# Patient Record
Sex: FEMALE | Race: BLACK OR AFRICAN AMERICAN | NOT HISPANIC OR LATINO | Employment: FULL TIME | URBAN - METROPOLITAN AREA
[De-identification: names, ages, dates, MRNs, and addresses within clinical notes are randomized per-mention and may not be internally consistent; named-entity substitution may affect disease eponyms.]

---

## 2017-11-09 PROBLEM — N85.02 COMPLEX ATYPICAL ENDOMETRIAL HYPERPLASIA: Status: ACTIVE | Noted: 2017-11-09

## 2017-11-09 RX ORDER — CARVEDILOL 25 MG/1
25 TABLET ORAL 2 TIMES DAILY WITH MEALS
COMMUNITY

## 2017-11-09 RX ORDER — AMLODIPINE BESYLATE 10 MG/1
10 TABLET ORAL DAILY
COMMUNITY

## 2017-11-09 RX ORDER — VALSARTAN AND HYDROCHLOROTHIAZIDE 320; 25 MG/1; MG/1
1 TABLET, FILM COATED ORAL DAILY
COMMUNITY

## 2017-11-09 NOTE — PROGRESS NOTES
"Subjective:       Patient ID: Brittany Oates is a 62 y.o. female.    Chief Complaint: Atypical endometrial hyperplasia     HPI     Recently seen by Dr. Ranjana Koo for PMB.  Patient had presented to ED in [facility] with PMB with passage of clots.   She was then sent to Dr. Koo. US showed 4 cm EMS. Uterus measures 9 x 6 x 6 cm. No adnexal masses. EMBx shgowed "complex hyperplasia with moderate atypia".   Dr. Koo did a saline infusion US on 11/8/2017 and the thickened EMS is diffuse and not suggestive of a polyp.    Pap is CAMILA.     Referred for further management.     Past Medical History:   Diagnosis Date    Anemia     Complex atypical endometrial hyperplasia 11/9/2017    Essential hypertension     Sickle cell anemia      History reviewed. No pertinent surgical history.  Family History   Problem Relation Age of Onset    Colon cancer Mother     Breast cancer Mother     Breast cancer Sister      in her 50s    Ovarian cancer Neg Hx     Uterine cancer Neg Hx      Social History   Substance Use Topics    Smoking status: Never Smoker    Smokeless tobacco: Never Used    Alcohol use No     Review of patient's allergies indicates:  No Known Allergies    Current Outpatient Prescriptions:     amLODIPine (NORVASC) 10 MG tablet, Take 10 mg by mouth once daily., Disp: , Rfl:     carvedilol (COREG) 25 MG tablet, Take 25 mg by mouth 2 (two) times daily with meals., Disp: , Rfl:     valsartan-hydrochlorothiazide (DIOVAN-HCT) 320-25 mg per tablet, Take 1 tablet by mouth once daily., Disp: , Rfl:     medroxyPROGESTERone (PROVERA) 10 MG tablet, Take 1 tablet (10 mg total) by mouth once daily., Disp: 30 tablet, Rfl: 0      Review of Systems   Constitutional: Negative for chills, fatigue and fever.   Respiratory: Negative for cough, shortness of breath and wheezing.    Cardiovascular: Negative for chest pain, palpitations and leg swelling.   Gastrointestinal: Negative for abdominal pain, constipation, diarrhea, " "nausea and vomiting.   Genitourinary: Negative for difficulty urinating, dysuria, frequency, genital sores, hematuria, urgency, vaginal bleeding, vaginal discharge and vaginal pain.   Neurological: Negative for weakness.   Hematological: Negative for adenopathy. Does not bruise/bleed easily.   Psychiatric/Behavioral: The patient is not nervous/anxious.        Objective:     BP (!) 150/89   Pulse 83   Ht 4' 11" (1.499 m)   Wt 72.7 kg (160 lb 4.4 oz)   BMI 32.37 kg/m²     Physical Exam   Constitutional: She is oriented to person, place, and time. She appears well-developed and well-nourished.   HENT:   Head: Normocephalic and atraumatic.   Eyes: No scleral icterus.   Neck: Neck supple. No tracheal deviation present. No thyroid mass and no thyromegaly present.   Cardiovascular: Normal rate and regular rhythm.    Pulmonary/Chest: Effort normal and breath sounds normal. She has no wheezes.   Abdominal: Soft. She exhibits no distension and no mass. There is no hepatosplenomegaly. There is no tenderness. There is no rebound and no guarding.   Genitourinary:   Genitourinary Comments: Bimanual exam:  Vulva: no lesions. Normal appearance  Urethra: Normal size and location. No lesions  Bladder: No masses or tenderness.  Vagina: normal mucosa. No lesion  Cervix: normal   Uterus: normal. Difficult to palpate due to obesity.  Adnexa: no masses.  Rectovaginal: No posterior cul de sac thickening or nodularity.  Rectal: no masses. Nontender. Normal tone.     EMBx done. Small amount of bleeding afterwards.      Musculoskeletal: She exhibits no edema or tenderness.   Lymphadenopathy:     She has no cervical adenopathy.     She has no axillary adenopathy.        Right: No inguinal and no supraclavicular adenopathy present.        Left: No inguinal and no supraclavicular adenopathy present.   Neurological: She is alert and oriented to person, place, and time.   Skin: Skin is warm and dry.   Psychiatric: She has a normal mood and " affect. Her behavior is normal. Judgment and thought content normal.       Assessment:       1. Complex atypical endometrial hyperplasia        Plan:   Complex atypical endometrial hyperplasia  I discussed proceeding with RALH/BSO and staging as indicated by frozen section.   Consent forms were reviewed with patient. Questions were answered. Patient voiced understanding. Consents were signed.  Preop orders placed.   -     CBC auto differential; Future; Expected date: 11/10/2017  -     Basic metabolic panel; Future; Expected date: 11/10/2017  -     EKG 12-lead; Future    -     medroxyPROGESTERone (PROVERA) 10 MG tablet; Take 1 tablet (10 mg total) by mouth once daily.  Dispense: 30 tablet; Refill: 0      Distress Screening Results: Psychosocial Distress screening score of Distress Score: 5 noted and reviewed. No intervention indicated.

## 2017-11-10 ENCOUNTER — TELEPHONE (OUTPATIENT)
Dept: GYNECOLOGIC ONCOLOGY | Facility: CLINIC | Age: 62
End: 2017-11-10

## 2017-11-10 ENCOUNTER — LAB VISIT (OUTPATIENT)
Dept: LAB | Facility: HOSPITAL | Age: 62
End: 2017-11-10
Attending: OBSTETRICS & GYNECOLOGY
Payer: COMMERCIAL

## 2017-11-10 ENCOUNTER — INITIAL CONSULT (OUTPATIENT)
Dept: GYNECOLOGIC ONCOLOGY | Facility: CLINIC | Age: 62
End: 2017-11-10
Payer: COMMERCIAL

## 2017-11-10 ENCOUNTER — HOSPITAL ENCOUNTER (OUTPATIENT)
Dept: CARDIOLOGY | Facility: CLINIC | Age: 62
Discharge: HOME | End: 2017-11-10
Payer: COMMERCIAL

## 2017-11-10 VITALS
HEIGHT: 59 IN | WEIGHT: 160.25 LBS | SYSTOLIC BLOOD PRESSURE: 150 MMHG | DIASTOLIC BLOOD PRESSURE: 89 MMHG | BODY MASS INDEX: 32.31 KG/M2 | HEART RATE: 83 BPM

## 2017-11-10 DIAGNOSIS — N85.02 COMPLEX ATYPICAL ENDOMETRIAL HYPERPLASIA: ICD-10-CM

## 2017-11-10 DIAGNOSIS — N85.02 COMPLEX ATYPICAL ENDOMETRIAL HYPERPLASIA: Primary | ICD-10-CM

## 2017-11-10 LAB
ANION GAP SERPL CALC-SCNC: 8 MMOL/L
BASOPHILS # BLD AUTO: 0.03 K/UL
BASOPHILS NFR BLD: 0.6 %
BUN SERPL-MCNC: 14 MG/DL
CALCIUM SERPL-MCNC: 9.3 MG/DL
CHLORIDE SERPL-SCNC: 109 MMOL/L
CO2 SERPL-SCNC: 25 MMOL/L
CREAT SERPL-MCNC: 0.7 MG/DL
DIFFERENTIAL METHOD: ABNORMAL
EOSINOPHIL # BLD AUTO: 0.1 K/UL
EOSINOPHIL NFR BLD: 2.3 %
ERYTHROCYTE [DISTWIDTH] IN BLOOD BY AUTOMATED COUNT: 13.3 %
EST. GFR  (AFRICAN AMERICAN): >60 ML/MIN/1.73 M^2
EST. GFR  (NON AFRICAN AMERICAN): >60 ML/MIN/1.73 M^2
GLUCOSE SERPL-MCNC: 81 MG/DL
HCT VFR BLD AUTO: 31.9 %
HGB BLD-MCNC: 10.3 G/DL
IMM GRANULOCYTES # BLD AUTO: 0.02 K/UL
IMM GRANULOCYTES NFR BLD AUTO: 0.4 %
LYMPHOCYTES # BLD AUTO: 2.5 K/UL
LYMPHOCYTES NFR BLD: 48.8 %
MCH RBC QN AUTO: 29.4 PG
MCHC RBC AUTO-ENTMCNC: 32.3 G/DL
MCV RBC AUTO: 91 FL
MONOCYTES # BLD AUTO: 0.5 K/UL
MONOCYTES NFR BLD: 9.9 %
NEUTROPHILS # BLD AUTO: 2 K/UL
NEUTROPHILS NFR BLD: 38 %
NRBC BLD-RTO: 0 /100 WBC
PLATELET # BLD AUTO: 262 K/UL
PMV BLD AUTO: 9.1 FL
POTASSIUM SERPL-SCNC: 3.6 MMOL/L
RBC # BLD AUTO: 3.5 M/UL
SODIUM SERPL-SCNC: 142 MMOL/L
WBC # BLD AUTO: 5.14 K/UL

## 2017-11-10 PROCEDURE — 85025 COMPLETE CBC W/AUTO DIFF WBC: CPT

## 2017-11-10 PROCEDURE — 80048 BASIC METABOLIC PNL TOTAL CA: CPT

## 2017-11-10 PROCEDURE — 99205 OFFICE O/P NEW HI 60 MIN: CPT | Mod: 25,S$GLB,, | Performed by: OBSTETRICS & GYNECOLOGY

## 2017-11-10 PROCEDURE — 58100 BIOPSY OF UTERUS LINING: CPT | Mod: S$GLB,,, | Performed by: OBSTETRICS & GYNECOLOGY

## 2017-11-10 PROCEDURE — 99999 PR PBB SHADOW E&M-EST. PATIENT-LVL III: CPT | Mod: PBBFAC,,, | Performed by: OBSTETRICS & GYNECOLOGY

## 2017-11-10 PROCEDURE — 88305 TISSUE EXAM BY PATHOLOGIST: CPT | Performed by: PATHOLOGY

## 2017-11-10 PROCEDURE — 36415 COLL VENOUS BLD VENIPUNCTURE: CPT

## 2017-11-10 PROCEDURE — 93000 ELECTROCARDIOGRAM COMPLETE: CPT | Mod: S$GLB,,, | Performed by: INTERNAL MEDICINE

## 2017-11-10 RX ORDER — MEDROXYPROGESTERONE ACETATE 10 MG/1
10 TABLET ORAL DAILY
Qty: 30 TABLET | Refills: 0 | Status: SHIPPED | OUTPATIENT
Start: 2017-11-10 | End: 2017-11-10 | Stop reason: SDUPTHER

## 2017-11-10 RX ORDER — MEDROXYPROGESTERONE ACETATE 10 MG/1
10 TABLET ORAL DAILY
Qty: 30 TABLET | Refills: 0 | Status: SHIPPED | OUTPATIENT
Start: 2017-11-10 | End: 2017-11-14 | Stop reason: SDUPTHER

## 2017-11-10 RX ORDER — LIDOCAINE HYDROCHLORIDE 10 MG/ML
1 INJECTION, SOLUTION EPIDURAL; INFILTRATION; INTRACAUDAL; PERINEURAL ONCE
Status: CANCELLED | OUTPATIENT
Start: 2017-11-10 | End: 2017-11-10

## 2017-11-10 RX ORDER — MUPIROCIN 20 MG/G
OINTMENT TOPICAL
Status: CANCELLED | OUTPATIENT
Start: 2017-11-10

## 2017-11-10 NOTE — PROCEDURES
Biopsy (Gynecological)  Date/Time: 11/10/2017 3:32 PM  Performed by: JESI MCKEON  Authorized by: JESI MCKEON     Consent Done?:  Yes (Written)  Local anesthesia used?: No      Biopsy Location:  Uterus   Patient tolerated the procedure well with no immediate complications.

## 2017-11-10 NOTE — LETTER
November 10, 2017      Trudy Yu MD  [address]  [place] [address]           [facility] - GYN Oncology  [address]  [place] [address]  Phone: [phone]          Patient: Brittany Oates   MR Number: [id number]   YOB: 1955   Date of Visit: 11/10/2017       Dear Dr. Trudy Yu:    Thank you for referring Brittany Oates to me for evaluation. Attached you will find relevant portions of my assessment and plan of care.    If you have questions, please do not hesitate to call me. I look forward to following Brittany Oates along with you.    Sincerely,    Edinson Hunter MD    Enclosure  CC:  No Recipients    If you would like to receive this communication electronically, please contact [email or web address] or [phone] to request more information on [employer] access.    For providers and/or their staff who would like to refer a patient to [facility], please contact us through our one-stop-shop provider referral line, [facility], at [phone].    If you feel you have received this communication in error or would no longer like to receive these types of communications, please e-mail [email or web address]

## 2017-11-14 DIAGNOSIS — N85.02 COMPLEX ATYPICAL ENDOMETRIAL HYPERPLASIA: ICD-10-CM

## 2017-11-14 RX ORDER — MEDROXYPROGESTERONE ACETATE 10 MG/1
10 TABLET ORAL DAILY
Qty: 30 TABLET | Refills: 0 | Status: SHIPPED | OUTPATIENT
Start: 2017-11-14 | End: 2017-11-14 | Stop reason: SDUPTHER

## 2017-11-14 RX ORDER — MEDROXYPROGESTERONE ACETATE 10 MG/1
10 TABLET ORAL DAILY
Qty: 30 TABLET | Refills: 0 | Status: SHIPPED | OUTPATIENT
Start: 2017-11-14 | End: 2017-11-16 | Stop reason: SDUPTHER

## 2017-11-14 NOTE — TELEPHONE ENCOUNTER
----- Message from Marifer Agrawal sent at 11/14/2017  9:10 AM CST -----  Contact: pt's leonides salmeron daughter [phone]      _  1st Request  _  2nd Request  _  3rd Request    Please refill the medication(s) listed below. Please call the patient when the prescription(s) is ready for  at this phone number      pt's leonides salmeron daughter [phone]      Medication #1medroxyPROGESTERone (PROVERA) 10 MG tablet     Medication #2      [facility]

## 2017-11-15 ENCOUNTER — TELEPHONE (OUTPATIENT)
Dept: GYNECOLOGIC ONCOLOGY | Facility: CLINIC | Age: 62
End: 2017-11-15

## 2017-11-16 ENCOUNTER — TELEPHONE (OUTPATIENT)
Dept: GYNECOLOGIC ONCOLOGY | Facility: CLINIC | Age: 62
End: 2017-11-16

## 2017-11-16 DIAGNOSIS — N85.02 COMPLEX ATYPICAL ENDOMETRIAL HYPERPLASIA: ICD-10-CM

## 2017-11-16 RX ORDER — MEDROXYPROGESTERONE ACETATE 10 MG/1
10 TABLET ORAL DAILY
Qty: 30 TABLET | Refills: 0 | Status: ON HOLD | OUTPATIENT
Start: 2017-11-16 | End: 2017-12-01 | Stop reason: HOSPADM

## 2017-11-16 NOTE — TELEPHONE ENCOUNTER
Pt daughter returned phone call. She states for medication was suppose to go to [facility]. She was informed I will contact when medication has been sent to the correct pharmacy. She says thank you

## 2017-11-16 NOTE — TELEPHONE ENCOUNTER
----- Message from Lyndsay Hare sent at 11/16/2017  8:35 AM CST -----  RE: Brittanynadine Oates                   [facility]# [id number]     Ms Oates's daughter called/# [phone]     The provera was suppose to be sent to [facility] on [address] in [place] & not [facility]/can the Rx be transferred?

## 2017-11-29 ENCOUNTER — TELEPHONE (OUTPATIENT)
Dept: GYNECOLOGIC ONCOLOGY | Facility: CLINIC | Age: 62
End: 2017-11-29

## 2017-11-29 ENCOUNTER — ANESTHESIA EVENT (OUTPATIENT)
Dept: SURGERY | Facility: HOSPITAL | Age: 62
DRG: 735 | End: 2017-11-29
Payer: COMMERCIAL

## 2017-11-29 NOTE — ANESTHESIA PREPROCEDURE EVALUATION
[facility]  Anesthesia Pre-Operative Evaluation         Patient Name: Brittany Oates  YOB: 1955  MRN: [id number]    SUBJECTIVE:     Pre-operative evaluation for Procedure(s) (LRB):  ROBOTIC ASSISTED LAPAROSCOPIC HYSTERECTOMY (N/A)  [id number] (Bilateral)  DISSECTION-LYMPH NODE - pelvic and periaortic  (N/A)     11/29/2017    Brittany Oates is a 62 y.o. female w/ a significant PMHx of HTN, sickle cell trait (Hb 10.3) and endometrial hyperplasia.    Patient now presents for the above procedure(s).      LDA: None documented.    Prev airway: None documented.    Drips: None documented.    Patient Active Problem List   Diagnosis    Complex atypical endometrial hyperplasia       Review of patient's allergies indicates:  No Known Allergies    No current facility-administered medications for this encounter.     Current Outpatient Prescriptions:     amLODIPine (NORVASC) 10 MG tablet, Take 10 mg by mouth once daily., Disp: , Rfl:     carvedilol (COREG) 25 MG tablet, Take 25 mg by mouth 2 (two) times daily with meals., Disp: , Rfl:     medroxyPROGESTERone (PROVERA) 10 MG tablet, Take 1 tablet (10 mg total) by mouth once daily., Disp: 30 tablet, Rfl: 0    valsartan-hydrochlorothiazide (DIOVAN-HCT) 320-25 mg per tablet, Take 1 tablet by mouth once daily., Disp: , Rfl:     No current facility-administered medications on file prior to encounter.      Current Outpatient Prescriptions on File Prior to Encounter   Medication Sig Dispense Refill    amLODIPine (NORVASC) 10 MG tablet Take 10 mg by mouth once daily.      carvedilol (COREG) 25 MG tablet Take 25 mg by mouth 2 (two) times daily with meals.      valsartan-hydrochlorothiazide (DIOVAN-HCT) 320-25 mg per tablet Take 1 tablet by mouth once daily.         No past surgical history on file.    Social History     Social History    Marital status:      Spouse name: N/A    Number of children: N/A    Years of  education: N/A     Occupational History    Not on file.     Social History Main Topics    Smoking status: Never Smoker    Smokeless tobacco: Never Used    Alcohol use No    Drug use: Unknown    Sexual activity: Not on file     Other Topics Concern    Not on file     Social History Narrative    No narrative on file       OBJECTIVE:     Vital Signs Range (Last 24H):         Significant Labs:  Lab Results   Component Value Date    WBC 5.14 11/10/2017    HGB 10.3 (L) 11/10/2017    HCT 31.9 (L) 11/10/2017     11/10/2017     11/10/2017    K 3.6 11/10/2017     11/10/2017    CREATININE 0.7 11/10/2017    BUN 14 11/10/2017    CO2 25 11/10/2017       Diagnostic Studies: No relevant studies.    EKG: No recent studies available.    2D ECHO:  No results found for this or any previous visit.       ASSESSMENT/PLAN:         Anesthesia Evaluation    I have reviewed the Patient Summary Reports.     I have reviewed the Medications.     Review of Systems  Anesthesia Hx:  No previous Anesthesia  Neg history of prior surgery. Denies Family Hx of Anesthesia complications.    Social:  Non-Smoker    Hematology/Oncology:         -- Anemia (sickle cell trait):   Cardiovascular:   Hypertension    Pulmonary:   Shortness of breath    Hepatic/GI:  Hepatic/GI Normal    OB/GYN/PEDS:  Endometrial hyperplasia     Neurological:  Neurology Normal    Endocrine:  Endocrine Normal        Physical Exam  General:  Well nourished    Airway/Jaw/Neck:  Airway Findings: Mouth Opening: Normal Tongue: Normal  General Airway Assessment: Adult  Mallampati: III  Improves to II with phonation.  TM Distance: 4 - 6 cm  Jaw/Neck Findings:  Neck ROM: Normal ROM      Dental:  Dental Findings: Upper Dentures   Chest/Lungs:  Chest/Lungs Findings: Clear to auscultation, Normal Respiratory Rate     Heart/Vascular:  Heart Findings: Rate: Normal  Rhythm: Regular Rhythm  Sounds: Normal        Mental Status:  Mental Status Findings:  Cooperative, Alert  and Oriented         Anesthesia Plan  Type of Anesthesia, risks & benefits discussed:  Anesthesia Type:  general  Patient's Preference:   Intra-op Monitoring Plan: standard ASA monitors and arterial line  Intra-op Monitoring Plan Comments:   Post Op Pain Control Plan: per primary service following discharge from PACU and multimodal analgesia  Post Op Pain Control Plan Comments:   Induction:   IV  Beta Blocker:  Patient is on a Beta-Blocker and has received one dose within the past 24 hours (No further documentation required).       Informed Consent: Patient understands risks and agrees with Anesthesia plan.  Questions answered. Anesthesia consent signed with patient.  ASA Score: 3     Day of Surgery Review of History & Physical:    H&P update referred to the surgeon.         Ready For Surgery From Anesthesia Perspective.

## 2017-11-29 NOTE — PRE-PROCEDURE INSTRUCTIONS
PreOp Instructions given:     - Verbal medication information (what to hold and what to take)   - NPO guidelines   - Arrival place directions given;   - Bathing with antibacterial soap   - Don't wear any jewelry or bring any valuables AM of surgery   - No makeup or moisturizer to face   - No perfume/cologne, powder, lotions or aftershave     Pt. verbalized understanding.     Denies any family history of side effects or issues with anesthesia or sedation.

## 2017-11-29 NOTE — TELEPHONE ENCOUNTER
Called pt to confirm surgery arrival date and time on Thursday 11/30/17 at 5:30am. Pt states that she will be there.  MA/LPN

## 2017-11-30 ENCOUNTER — ANESTHESIA (OUTPATIENT)
Dept: SURGERY | Facility: HOSPITAL | Age: 62
DRG: 735 | End: 2017-11-30
Payer: COMMERCIAL

## 2017-11-30 ENCOUNTER — SURGERY (OUTPATIENT)
Age: 62
End: 2017-11-30

## 2017-11-30 ENCOUNTER — HOSPITAL ENCOUNTER (INPATIENT)
Facility: HOSPITAL | Age: 62
LOS: 1 days | Discharge: HOME | DRG: 735 | End: 2017-12-01
Attending: OBSTETRICS & GYNECOLOGY | Admitting: OBSTETRICS & GYNECOLOGY
Payer: COMMERCIAL

## 2017-11-30 DIAGNOSIS — Z90.722 S/P TOTAL HYSTERECTOMY AND BILATERAL SALPINGO-OOPHORECTOMY: Primary | ICD-10-CM

## 2017-11-30 DIAGNOSIS — Z90.79 S/P TOTAL HYSTERECTOMY AND BILATERAL SALPINGO-OOPHORECTOMY: Primary | ICD-10-CM

## 2017-11-30 DIAGNOSIS — Z90.710 S/P TOTAL HYSTERECTOMY AND BILATERAL SALPINGO-OOPHORECTOMY: Primary | ICD-10-CM

## 2017-11-30 DIAGNOSIS — N85.02 COMPLEX ATYPICAL ENDOMETRIAL HYPERPLASIA: ICD-10-CM

## 2017-11-30 PROBLEM — C54.1 ENDOMETRIAL CARCINOMA: Status: ACTIVE | Noted: 2017-11-09

## 2017-11-30 PROBLEM — I10 ESSENTIAL HYPERTENSION: Status: ACTIVE | Noted: 2017-11-30

## 2017-11-30 LAB
ABO + RH BLD: NORMAL
BLD GP AB SCN CELLS X3 SERPL QL: NORMAL

## 2017-11-30 PROCEDURE — 88305 TISSUE EXAM BY PATHOLOGIST: CPT | Mod: 26,,, | Performed by: PATHOLOGY

## 2017-11-30 PROCEDURE — 8E0W4CZ ROBOTIC ASSISTED PROCEDURE OF TRUNK REGION, PERCUTANEOUS ENDOSCOPIC APPROACH: ICD-10-PCS | Performed by: OBSTETRICS & GYNECOLOGY

## 2017-11-30 PROCEDURE — D9220A PRA ANESTHESIA: Mod: ,,, | Performed by: ANESTHESIOLOGY

## 2017-11-30 PROCEDURE — 86850 RBC ANTIBODY SCREEN: CPT

## 2017-11-30 PROCEDURE — 0UT7FZZ RESECTION OF BILATERAL FALLOPIAN TUBES, VIA NATURAL OR ARTIFICIAL OPENING WITH PERCUTANEOUS ENDOSCOPIC ASSISTANCE: ICD-10-PCS | Performed by: OBSTETRICS & GYNECOLOGY

## 2017-11-30 PROCEDURE — 63600175 PHARM REV CODE 636 W HCPCS: Performed by: STUDENT IN AN ORGANIZED HEALTH CARE EDUCATION/TRAINING PROGRAM

## 2017-11-30 PROCEDURE — 07TC4ZZ RESECTION OF PELVIS LYMPHATIC, PERCUTANEOUS ENDOSCOPIC APPROACH: ICD-10-PCS | Performed by: OBSTETRICS & GYNECOLOGY

## 2017-11-30 PROCEDURE — 0UT9FZZ RESECTION OF UTERUS, VIA NATURAL OR ARTIFICIAL OPENING WITH PERCUTANEOUS ENDOSCOPIC ASSISTANCE: ICD-10-PCS | Performed by: OBSTETRICS & GYNECOLOGY

## 2017-11-30 PROCEDURE — 36000713 HC OR TIME LEV V EA ADD 15 MIN: Performed by: OBSTETRICS & GYNECOLOGY

## 2017-11-30 PROCEDURE — 94799 UNLISTED PULMONARY SVC/PX: CPT

## 2017-11-30 PROCEDURE — 11000001 HC ACUTE MED/SURG PRIVATE ROOM

## 2017-11-30 PROCEDURE — 25000003 PHARM REV CODE 250: Performed by: STUDENT IN AN ORGANIZED HEALTH CARE EDUCATION/TRAINING PROGRAM

## 2017-11-30 PROCEDURE — 88305 TISSUE EXAM BY PATHOLOGIST: CPT | Performed by: PATHOLOGY

## 2017-11-30 PROCEDURE — 0UTC7ZZ RESECTION OF CERVIX, VIA NATURAL OR ARTIFICIAL OPENING: ICD-10-PCS | Performed by: OBSTETRICS & GYNECOLOGY

## 2017-11-30 PROCEDURE — 25000003 PHARM REV CODE 250: Performed by: OBSTETRICS & GYNECOLOGY

## 2017-11-30 PROCEDURE — 88112 CYTOPATH CELL ENHANCE TECH: CPT | Mod: 26,,, | Performed by: PATHOLOGY

## 2017-11-30 PROCEDURE — 88331 PATH CONSLTJ SURG 1 BLK 1SPC: CPT | Performed by: PATHOLOGY

## 2017-11-30 PROCEDURE — 58571 TLH W/T/O 250 G OR LESS: CPT | Mod: ,,, | Performed by: OBSTETRICS & GYNECOLOGY

## 2017-11-30 PROCEDURE — 0UT2FZZ RESECTION OF BILATERAL OVARIES, VIA NATURAL OR ARTIFICIAL OPENING WITH PERCUTANEOUS ENDOSCOPIC ASSISTANCE: ICD-10-PCS | Performed by: OBSTETRICS & GYNECOLOGY

## 2017-11-30 PROCEDURE — 37000008 HC ANESTHESIA 1ST 15 MINUTES: Performed by: OBSTETRICS & GYNECOLOGY

## 2017-11-30 PROCEDURE — 07BD4ZZ EXCISION OF AORTIC LYMPHATIC, PERCUTANEOUS ENDOSCOPIC APPROACH: ICD-10-PCS | Performed by: OBSTETRICS & GYNECOLOGY

## 2017-11-30 PROCEDURE — 71000033 HC RECOVERY, INTIAL HOUR: Performed by: OBSTETRICS & GYNECOLOGY

## 2017-11-30 PROCEDURE — 27201423 OPTIME MED/SURG SUP & DEVICES STERILE SUPPLY: Performed by: OBSTETRICS & GYNECOLOGY

## 2017-11-30 PROCEDURE — 38572 LAPAROSCOPY LYMPHADENECTOMY: CPT | Mod: 51,,, | Performed by: OBSTETRICS & GYNECOLOGY

## 2017-11-30 PROCEDURE — 71000039 HC RECOVERY, EACH ADD'L HOUR: Performed by: OBSTETRICS & GYNECOLOGY

## 2017-11-30 PROCEDURE — 88307 TISSUE EXAM BY PATHOLOGIST: CPT | Mod: 26,,, | Performed by: PATHOLOGY

## 2017-11-30 PROCEDURE — 88331 PATH CONSLTJ SURG 1 BLK 1SPC: CPT | Mod: 26,,, | Performed by: PATHOLOGY

## 2017-11-30 PROCEDURE — 86900 BLOOD TYPING SEROLOGIC ABO: CPT

## 2017-11-30 PROCEDURE — 86920 COMPATIBILITY TEST SPIN: CPT

## 2017-11-30 PROCEDURE — 36000712 HC OR TIME LEV V 1ST 15 MIN: Performed by: OBSTETRICS & GYNECOLOGY

## 2017-11-30 PROCEDURE — 37000009 HC ANESTHESIA EA ADD 15 MINS: Performed by: OBSTETRICS & GYNECOLOGY

## 2017-11-30 RX ORDER — CARVEDILOL 25 MG/1
25 TABLET ORAL 2 TIMES DAILY WITH MEALS
Status: DISCONTINUED | OUTPATIENT
Start: 2017-11-30 | End: 2017-12-01 | Stop reason: HOSPADM

## 2017-11-30 RX ORDER — SODIUM CHLORIDE 0.9 % (FLUSH) 0.9 %
3 SYRINGE (ML) INJECTION
Status: DISCONTINUED | OUTPATIENT
Start: 2017-11-30 | End: 2017-12-01 | Stop reason: HOSPADM

## 2017-11-30 RX ORDER — HYDROMORPHONE HYDROCHLORIDE 2 MG/ML
0.2 INJECTION, SOLUTION INTRAMUSCULAR; INTRAVENOUS; SUBCUTANEOUS EVERY 5 MIN PRN
Status: DISCONTINUED | OUTPATIENT
Start: 2017-11-30 | End: 2017-11-30 | Stop reason: HOSPADM

## 2017-11-30 RX ORDER — CEFAZOLIN SODIUM 1 G/3ML
INJECTION, POWDER, FOR SOLUTION INTRAMUSCULAR; INTRAVENOUS
Status: DISCONTINUED | OUTPATIENT
Start: 2017-11-30 | End: 2017-11-30

## 2017-11-30 RX ORDER — MUPIROCIN 20 MG/G
OINTMENT TOPICAL
Status: DISCONTINUED | OUTPATIENT
Start: 2017-11-30 | End: 2017-11-30

## 2017-11-30 RX ORDER — ONDANSETRON 2 MG/ML
4 INJECTION INTRAMUSCULAR; INTRAVENOUS EVERY 12 HOURS PRN
Status: DISCONTINUED | OUTPATIENT
Start: 2017-11-30 | End: 2017-12-01 | Stop reason: HOSPADM

## 2017-11-30 RX ORDER — DEXTROSE MONOHYDRATE, SODIUM CHLORIDE, AND POTASSIUM CHLORIDE 50; 1.49; 4.5 G/1000ML; G/1000ML; G/1000ML
INJECTION, SOLUTION INTRAVENOUS CONTINUOUS
Status: DISCONTINUED | OUTPATIENT
Start: 2017-11-30 | End: 2017-12-01 | Stop reason: HOSPADM

## 2017-11-30 RX ORDER — PHENYLEPHRINE HYDROCHLORIDE 10 MG/ML
INJECTION INTRAVENOUS
Status: DISCONTINUED | OUTPATIENT
Start: 2017-11-30 | End: 2017-11-30

## 2017-11-30 RX ORDER — NEOSTIGMINE METHYLSULFATE 1 MG/ML
INJECTION, SOLUTION INTRAVENOUS
Status: DISCONTINUED | OUTPATIENT
Start: 2017-11-30 | End: 2017-11-30

## 2017-11-30 RX ORDER — MIDAZOLAM HYDROCHLORIDE 1 MG/ML
INJECTION, SOLUTION INTRAMUSCULAR; INTRAVENOUS
Status: DISCONTINUED | OUTPATIENT
Start: 2017-11-30 | End: 2017-11-30

## 2017-11-30 RX ORDER — SODIUM CHLORIDE 9 MG/ML
INJECTION, SOLUTION INTRAVENOUS CONTINUOUS
Status: DISCONTINUED | OUTPATIENT
Start: 2017-11-30 | End: 2017-11-30

## 2017-11-30 RX ORDER — ROCURONIUM BROMIDE 10 MG/ML
INJECTION, SOLUTION INTRAVENOUS
Status: DISCONTINUED | OUTPATIENT
Start: 2017-11-30 | End: 2017-11-30

## 2017-11-30 RX ORDER — LIDOCAINE HYDROCHLORIDE 10 MG/ML
1 INJECTION, SOLUTION EPIDURAL; INFILTRATION; INTRACAUDAL; PERINEURAL ONCE
Status: COMPLETED | OUTPATIENT
Start: 2017-11-30 | End: 2017-11-30

## 2017-11-30 RX ORDER — HYDROCODONE BITARTRATE AND ACETAMINOPHEN 5; 325 MG/1; MG/1
1 TABLET ORAL EVERY 4 HOURS PRN
Status: DISCONTINUED | OUTPATIENT
Start: 2017-11-30 | End: 2017-11-30

## 2017-11-30 RX ORDER — DEXAMETHASONE SODIUM PHOSPHATE 4 MG/ML
INJECTION, SOLUTION INTRA-ARTICULAR; INTRALESIONAL; INTRAMUSCULAR; INTRAVENOUS; SOFT TISSUE
Status: DISCONTINUED | OUTPATIENT
Start: 2017-11-30 | End: 2017-11-30

## 2017-11-30 RX ORDER — OXYCODONE AND ACETAMINOPHEN 5; 325 MG/1; MG/1
1 TABLET ORAL EVERY 4 HOURS PRN
Status: DISCONTINUED | OUTPATIENT
Start: 2017-11-30 | End: 2017-12-01 | Stop reason: HOSPADM

## 2017-11-30 RX ORDER — ONDANSETRON 8 MG/1
8 TABLET, ORALLY DISINTEGRATING ORAL EVERY 8 HOURS PRN
Status: DISCONTINUED | OUTPATIENT
Start: 2017-11-30 | End: 2017-12-01 | Stop reason: HOSPADM

## 2017-11-30 RX ORDER — HYDROMORPHONE HYDROCHLORIDE 2 MG/ML
1 INJECTION, SOLUTION INTRAMUSCULAR; INTRAVENOUS; SUBCUTANEOUS
Status: DISCONTINUED | OUTPATIENT
Start: 2017-11-30 | End: 2017-12-01 | Stop reason: HOSPADM

## 2017-11-30 RX ORDER — IBUPROFEN 600 MG/1
600 TABLET ORAL EVERY 6 HOURS
Status: COMPLETED | OUTPATIENT
Start: 2017-11-30 | End: 2017-12-01

## 2017-11-30 RX ORDER — GLYCOPYRROLATE 0.2 MG/ML
INJECTION INTRAMUSCULAR; INTRAVENOUS
Status: DISCONTINUED | OUTPATIENT
Start: 2017-11-30 | End: 2017-11-30

## 2017-11-30 RX ORDER — OXYCODONE AND ACETAMINOPHEN 10; 325 MG/1; MG/1
1 TABLET ORAL EVERY 4 HOURS PRN
Status: DISCONTINUED | OUTPATIENT
Start: 2017-11-30 | End: 2017-12-01 | Stop reason: HOSPADM

## 2017-11-30 RX ORDER — ONDANSETRON 2 MG/ML
INJECTION INTRAMUSCULAR; INTRAVENOUS
Status: DISCONTINUED | OUTPATIENT
Start: 2017-11-30 | End: 2017-11-30

## 2017-11-30 RX ORDER — SODIUM CHLORIDE 0.9 % (FLUSH) 0.9 %
3 SYRINGE (ML) INJECTION
Status: DISCONTINUED | OUTPATIENT
Start: 2017-11-30 | End: 2017-11-30 | Stop reason: HOSPADM

## 2017-11-30 RX ORDER — HYDROMORPHONE HYDROCHLORIDE 2 MG/ML
0.2 INJECTION, SOLUTION INTRAMUSCULAR; INTRAVENOUS; SUBCUTANEOUS EVERY 5 MIN PRN
Status: DISCONTINUED | OUTPATIENT
Start: 2017-11-30 | End: 2017-11-30

## 2017-11-30 RX ORDER — LIDOCAINE HCL/PF 100 MG/5ML
SYRINGE (ML) INTRAVENOUS
Status: DISCONTINUED | OUTPATIENT
Start: 2017-11-30 | End: 2017-11-30

## 2017-11-30 RX ORDER — FENTANYL CITRATE 50 UG/ML
INJECTION, SOLUTION INTRAMUSCULAR; INTRAVENOUS
Status: DISCONTINUED | OUTPATIENT
Start: 2017-11-30 | End: 2017-11-30

## 2017-11-30 RX ORDER — ACETAMINOPHEN 10 MG/ML
INJECTION, SOLUTION INTRAVENOUS
Status: DISCONTINUED | OUTPATIENT
Start: 2017-11-30 | End: 2017-11-30

## 2017-11-30 RX ORDER — OXYCODONE HYDROCHLORIDE 5 MG/1
10 TABLET ORAL EVERY 4 HOURS PRN
Status: DISCONTINUED | OUTPATIENT
Start: 2017-11-30 | End: 2017-11-30

## 2017-11-30 RX ORDER — SIMETHICONE 80 MG
1 TABLET,CHEWABLE ORAL 3 TIMES DAILY PRN
Status: DISCONTINUED | OUTPATIENT
Start: 2017-11-30 | End: 2017-12-01 | Stop reason: HOSPADM

## 2017-11-30 RX ORDER — PROPOFOL 10 MG/ML
VIAL (ML) INTRAVENOUS
Status: DISCONTINUED | OUTPATIENT
Start: 2017-11-30 | End: 2017-11-30

## 2017-11-30 RX ADMIN — MUPIROCIN: 20 OINTMENT TOPICAL at 06:11

## 2017-11-30 RX ADMIN — PHENYLEPHRINE HYDROCHLORIDE 100 MCG: 10 INJECTION INTRAVENOUS at 09:11

## 2017-11-30 RX ADMIN — IBUPROFEN 600 MG: 200 TABLET, FILM COATED ORAL at 06:11

## 2017-11-30 RX ADMIN — FENTANYL CITRATE 50 MCG: 50 INJECTION, SOLUTION INTRAMUSCULAR; INTRAVENOUS at 09:11

## 2017-11-30 RX ADMIN — CARVEDILOL 25 MG: 25 TABLET, FILM COATED ORAL at 04:11

## 2017-11-30 RX ADMIN — ACETAMINOPHEN 1000 MG: 10 INJECTION, SOLUTION INTRAVENOUS at 07:11

## 2017-11-30 RX ADMIN — DEXTROSE MONOHYDRATE, SODIUM CHLORIDE, AND POTASSIUM CHLORIDE: 50; 4.5; 1.49 INJECTION, SOLUTION INTRAVENOUS at 11:11

## 2017-11-30 RX ADMIN — SODIUM CHLORIDE: 0.9 INJECTION, SOLUTION INTRAVENOUS at 06:11

## 2017-11-30 RX ADMIN — SODIUM CHLORIDE, SODIUM GLUCONATE, SODIUM ACETATE, POTASSIUM CHLORIDE, MAGNESIUM CHLORIDE, SODIUM PHOSPHATE, DIBASIC, AND POTASSIUM PHOSPHATE: .53; .5; .37; .037; .03; .012; .00082 INJECTION, SOLUTION INTRAVENOUS at 09:11

## 2017-11-30 RX ADMIN — NEOSTIGMINE METHYLSULFATE 3 MG: 1 INJECTION INTRAVENOUS at 10:11

## 2017-11-30 RX ADMIN — FENTANYL CITRATE 100 MCG: 50 INJECTION, SOLUTION INTRAMUSCULAR; INTRAVENOUS at 07:11

## 2017-11-30 RX ADMIN — SODIUM CHLORIDE, SODIUM GLUCONATE, SODIUM ACETATE, POTASSIUM CHLORIDE, MAGNESIUM CHLORIDE, SODIUM PHOSPHATE, DIBASIC, AND POTASSIUM PHOSPHATE: .53; .5; .37; .037; .03; .012; .00082 INJECTION, SOLUTION INTRAVENOUS at 07:11

## 2017-11-30 RX ADMIN — DEXTROSE MONOHYDRATE, SODIUM CHLORIDE, AND POTASSIUM CHLORIDE: 50; 4.5; 1.49 INJECTION, SOLUTION INTRAVENOUS at 09:11

## 2017-11-30 RX ADMIN — ROCURONIUM BROMIDE 10 MG: 10 INJECTION, SOLUTION INTRAVENOUS at 09:11

## 2017-11-30 RX ADMIN — PHENYLEPHRINE HYDROCHLORIDE 100 MCG: 10 INJECTION INTRAVENOUS at 07:11

## 2017-11-30 RX ADMIN — DEXAMETHASONE SODIUM PHOSPHATE 4 MG: 4 INJECTION, SOLUTION INTRAMUSCULAR; INTRAVENOUS at 07:11

## 2017-11-30 RX ADMIN — ROCURONIUM BROMIDE 50 MG: 10 INJECTION, SOLUTION INTRAVENOUS at 07:11

## 2017-11-30 RX ADMIN — ROCURONIUM BROMIDE 10 MG: 10 INJECTION, SOLUTION INTRAVENOUS at 07:11

## 2017-11-30 RX ADMIN — PROPOFOL 200 MG: 10 INJECTION, EMULSION INTRAVENOUS at 07:11

## 2017-11-30 RX ADMIN — LIDOCAINE HYDROCHLORIDE 1 MG: 10 INJECTION, SOLUTION EPIDURAL; INFILTRATION; INTRACAUDAL; PERINEURAL at 06:11

## 2017-11-30 RX ADMIN — LIDOCAINE HYDROCHLORIDE 60 MG: 20 INJECTION, SOLUTION INTRAVENOUS at 07:11

## 2017-11-30 RX ADMIN — ONDANSETRON 4 MG: 2 INJECTION INTRAMUSCULAR; INTRAVENOUS at 10:11

## 2017-11-30 RX ADMIN — FENTANYL CITRATE 50 MCG: 50 INJECTION, SOLUTION INTRAMUSCULAR; INTRAVENOUS at 10:11

## 2017-11-30 RX ADMIN — ROCURONIUM BROMIDE 10 MG: 10 INJECTION, SOLUTION INTRAVENOUS at 08:11

## 2017-11-30 RX ADMIN — IBUPROFEN 600 MG: 200 TABLET, FILM COATED ORAL at 12:11

## 2017-11-30 RX ADMIN — GLYCOPYRROLATE 0.4 MG: 0.2 INJECTION, SOLUTION INTRAMUSCULAR; INTRAVENOUS at 10:11

## 2017-11-30 RX ADMIN — CEFAZOLIN 2 G: 1 INJECTION, POWDER, FOR SOLUTION INTRAVENOUS at 07:11

## 2017-11-30 RX ADMIN — MIDAZOLAM HYDROCHLORIDE 2 MG: 1 INJECTION, SOLUTION INTRAMUSCULAR; INTRAVENOUS at 07:11

## 2017-11-30 NOTE — H&P (VIEW-ONLY)
"Subjective:       Patient ID: Brittany Oates is a 62 y.o. female.    Chief Complaint: Atypical endometrial hyperplasia     HPI     Recently seen by Dr. Ranjana Koo for PMB.  Patient had presented to ED in [place] with PMB with passage of clots.   She was then sent to Dr. Koo. US showed 4 cm EMS. Uterus measures 9 x 6 x 6 cm. No adnexal masses. EMBx shgowed "complex hyperplasia with moderate atypia".   Dr. Koo did a saline infusion US on 11/8/2017 and the thickened EMS is diffuse and not suggestive of a polyp.    Pap is CAMILA.     Referred for further management.     Past Medical History:   Diagnosis Date    Anemia     Complex atypical endometrial hyperplasia 11/9/2017    Essential hypertension     Sickle cell anemia      History reviewed. No pertinent surgical history.  Family History   Problem Relation Age of Onset    Colon cancer Mother     Breast cancer Mother     Breast cancer Sister      in her 50s    Ovarian cancer Neg Hx     Uterine cancer Neg Hx      Social History   Substance Use Topics    Smoking status: Never Smoker    Smokeless tobacco: Never Used    Alcohol use No     Review of patient's allergies indicates:  No Known Allergies    Current Outpatient Prescriptions:     amLODIPine (NORVASC) 10 MG tablet, Take 10 mg by mouth once daily., Disp: , Rfl:     carvedilol (COREG) 25 MG tablet, Take 25 mg by mouth 2 (two) times daily with meals., Disp: , Rfl:     valsartan-hydrochlorothiazide (DIOVAN-HCT) 320-25 mg per tablet, Take 1 tablet by mouth once daily., Disp: , Rfl:     medroxyPROGESTERone (PROVERA) 10 MG tablet, Take 1 tablet (10 mg total) by mouth once daily., Disp: 30 tablet, Rfl: 0      Review of Systems   Constitutional: Negative for chills, fatigue and fever.   Respiratory: Negative for cough, shortness of breath and wheezing.    Cardiovascular: Negative for chest pain, palpitations and leg swelling.   Gastrointestinal: Negative for abdominal pain, constipation, diarrhea, " "nausea and vomiting.   Genitourinary: Negative for difficulty urinating, dysuria, frequency, genital sores, hematuria, urgency, vaginal bleeding, vaginal discharge and vaginal pain.   Neurological: Negative for weakness.   Hematological: Negative for adenopathy. Does not bruise/bleed easily.   Psychiatric/Behavioral: The patient is not nervous/anxious.        Objective:     BP (!) 150/89   Pulse 83   Ht 4' 11" (1.499 m)   Wt 72.7 kg (160 lb 4.4 oz)   BMI 32.37 kg/m²     Physical Exam   Constitutional: She is oriented to person, place, and time. She appears well-developed and well-nourished.   HENT:   Head: Normocephalic and atraumatic.   Eyes: No scleral icterus.   Neck: Neck supple. No tracheal deviation present. No thyroid mass and no thyromegaly present.   Cardiovascular: Normal rate and regular rhythm.    Pulmonary/Chest: Effort normal and breath sounds normal. She has no wheezes.   Abdominal: Soft. She exhibits no distension and no mass. There is no hepatosplenomegaly. There is no tenderness. There is no rebound and no guarding.   Genitourinary:   Genitourinary Comments: Bimanual exam:  Vulva: no lesions. Normal appearance  Urethra: Normal size and location. No lesions  Bladder: No masses or tenderness.  Vagina: normal mucosa. No lesion  Cervix: normal   Uterus: normal. Difficult to palpate due to obesity.  Adnexa: no masses.  Rectovaginal: No posterior cul de sac thickening or nodularity.  Rectal: no masses. Nontender. Normal tone.     EMBx done. Small amount of bleeding afterwards.      Musculoskeletal: She exhibits no edema or tenderness.   Lymphadenopathy:     She has no cervical adenopathy.     She has no axillary adenopathy.        Right: No inguinal and no supraclavicular adenopathy present.        Left: No inguinal and no supraclavicular adenopathy present.   Neurological: She is alert and oriented to person, place, and time.   Skin: Skin is warm and dry.   Psychiatric: She has a normal mood and " affect. Her behavior is normal. Judgment and thought content normal.       Assessment:       1. Complex atypical endometrial hyperplasia        Plan:   Complex atypical endometrial hyperplasia  I discussed proceeding with RALH/BSO and staging as indicated by frozen section.   Consent forms were reviewed with patient. Questions were answered. Patient voiced understanding. Consents were signed.  Preop orders placed.   -     CBC auto differential; Future; Expected date: 11/10/2017  -     Basic metabolic panel; Future; Expected date: 11/10/2017  -     EKG 12-lead; Future    -     medroxyPROGESTERone (PROVERA) 10 MG tablet; Take 1 tablet (10 mg total) by mouth once daily.  Dispense: 30 tablet; Refill: 0      Distress Screening Results: Psychosocial Distress screening score of Distress Score: 5 noted and reviewed. No intervention indicated.

## 2017-11-30 NOTE — PLAN OF CARE
"VSS. Patient states pain is tolerable/"sore". No N&V. Teds/SCD's in place throughout duration in PACU.  Lap site x5 clean/dry/intact, Mark catheter w/ clear yellow urine and adequate output. NS infusing at 100cc/hr.  Daughters at bedside and updated on POC. Awaiting room assignment for transfer.   "

## 2017-11-30 NOTE — SUBJECTIVE & OBJECTIVE
Hospital course:  11/30/2017 - Patient underwent RATLH/BSO/BP and PA LND staging as planned, uncomplicated. See Op note for details. Frozen pathology consistent with FIGO grade II. No acute issues.    Interval History: POD#0. Patient seen this PM, no acute issues. Pain is adequately controlled with PO medications. Tolerating clear liquid diet without nausea or vomiting. Has not ambulated or voided yet as brown catheter in place. Denies flatus or BM. No other complaints at this time.      Scheduled Meds:   carvedilol  25 mg Oral BID WM    ibuprofen  600 mg Oral Q6H     Continuous Infusions:   dextrose 5 % and 0.45 % NaCl with KCl 20 mEq 100 mL/hr at 11/30/17 1130     PRN Meds:hydrocodone-acetaminophen 5-325mg, HYDROmorphone, ondansetron, ondansetron, oxyCODONE, sodium chloride 0.9%    Review of patient's allergies indicates:  No Known Allergies    Objective:     Vital Signs (Most Recent):  Temp: 97.3 °F (36.3 °C) (11/30/17 1637)  Pulse: 92 (11/30/17 1637)  Resp: 18 (11/30/17 1637)  BP: (!) 142/70 (11/30/17 1637)  SpO2: 100 % (11/30/17 1637) Vital Signs (24h Range):  Temp:  [97.3 °F (36.3 °C)-98.6 °F (37 °C)] 97.3 °F (36.3 °C)  Pulse:  [52-92] 92  Resp:  [11-20] 18  SpO2:  [91 %-100 %] 100 %  BP: (119-142)/() 142/70     Weight: 72.6 kg (160 lb)  Body mass index is 32.32 kg/m².    Intake/Output - Last 3 Shifts       11/28 0700 - 11/29 0659 11/29 0700 - 11/30 0659 11/30 0700 - 12/01 0659    P.O.   220    I.V. (mL/kg)   2084 (28.7)    Total Intake(mL/kg)   2304 (31.7)    Urine (mL/kg/hr)   1210 (1.7)    Blood   50 (0.1)    Total Output     1260    Net     +1044                  Physical Exam:   Constitutional: She is oriented to person, place, and time. She appears well-developed and well-nourished. No distress.       Cardiovascular: Normal rate and regular rhythm.     Pulmonary/Chest: Effort normal. No respiratory distress.        Abdominal: Soft. She exhibits abdominal incision (port sites with steri-strips,  c/d/i). She exhibits no distension. There is tenderness (mild, appropriate). There is no rebound and no guarding.   Hypoactive bowel sounds     Genitourinary:   Genitourinary Comments: Mark in place draining clear yellow urine           Musculoskeletal: Moves all extremeties. She exhibits no edema.   TEDs/SCDs in place       Neurological: She is alert and oriented to person, place, and time.    Skin: Skin is warm and dry.    Psychiatric: She has a normal mood and affect. Her behavior is normal.       Lines/Drains/Airways     Drain                 Urethral Catheter 11/30/17 0750 Straight-tip;Non-latex 16 Fr. less than 1 day          Peripheral Intravenous Line                 Peripheral IV - Single Lumen 11/30/17 0623 Right Forearm less than 1 day         Peripheral IV - Single Lumen 11/30/17 0724 Left Hand less than 1 day                Laboratory:  None new

## 2017-11-30 NOTE — PROGRESS NOTES
[facility]  Gynecologic Oncology  Progress Note      Patient Name: Brittany Oates  MRN: [id number]  Admission Date: 11/30/2017  Hospital Length of Stay: 0 days  Attending Provider: Edinson Hunter MD  Primary Care Provider: Primary Doctor No  Principal Problem: Endometrial carcinoma    Follow-up For: Procedure(s) (LRB):  ROBOTIC ASSISTED LAPAROSCOPIC HYSTERECTOMY (N/A)  [id number] (Bilateral)  DISSECTION-LYMPH NODE - pelvic and periaortic  (N/A)  Post-Operative Day: Day of Surgery  Subjective:      History of Present Illness:  61 yo with PMHx significant for HTN presented as scheduled for RATLH/BSO/BP and PA LND staging for endometrial cancer, poorly differentiated. No complaints on admission.    Hospital Course:  11/30/2017 - Patient underwent RATLH/BSO/BP and PA LND staging as planned, uncomplicated. See Op note for details. Frozen pathology consistent with FIGO grade II. No acute issues.    Hospital course:  11/30/2017 - Patient underwent RATLH/BSO/BP and PA LND staging as planned, uncomplicated. See Op note for details. Frozen pathology consistent with FIGO grade II. No acute issues.    Interval History: POD#0. Patient seen this PM, no acute issues. Pain is adequately controlled with PO medications. Tolerating clear liquid diet without nausea or vomiting. Has not ambulated or voided yet as brown catheter in place. Denies flatus or BM. No other complaints at this time.      Scheduled Meds:   carvedilol  25 mg Oral BID WM    ibuprofen  600 mg Oral Q6H     Continuous Infusions:   dextrose 5 % and 0.45 % NaCl with KCl 20 mEq 100 mL/hr at 11/30/17 1130     PRN Meds:hydrocodone-acetaminophen 5-325mg, HYDROmorphone, ondansetron, ondansetron, oxyCODONE, sodium chloride 0.9%    Review of patient's allergies indicates:  No Known Allergies    Objective:     Vital Signs (Most Recent):  Temp: 97.3 °F (36.3 °C) (11/30/17 1637)  Pulse: 92 (11/30/17 1637)  Resp: 18 (11/30/17  1637)  BP: (!) 142/70 (11/30/17 1637)  SpO2: 100 % (11/30/17 1637) Vital Signs (24h Range):  Temp:  [97.3 °F (36.3 °C)-98.6 °F (37 °C)] 97.3 °F (36.3 °C)  Pulse:  [52-92] 92  Resp:  [11-20] 18  SpO2:  [91 %-100 %] 100 %  BP: (119-142)/() 142/70     Weight: 72.6 kg (160 lb)  Body mass index is 32.32 kg/m².    Intake/Output - Last 3 Shifts       11/28 0700 - 11/29 0659 11/29 0700 - 11/30 0659 11/30 0700 - 12/01 0659    P.O.   220    I.V. (mL/kg)   2084 (28.7)    Total Intake(mL/kg)   2304 (31.7)    Urine (mL/kg/hr)   1210 (1.7)    Blood   50 (0.1)    Total Output     1260    Net     +1044                  Physical Exam:   Constitutional: She is oriented to person, place, and time. She appears well-developed and well-nourished. No distress.       Cardiovascular: Normal rate and regular rhythm.     Pulmonary/Chest: Effort normal. No respiratory distress.        Abdominal: Soft. She exhibits abdominal incision (port sites with steri-strips, c/d/i). She exhibits no distension. There is tenderness (mild, appropriate). There is no rebound and no guarding.   Hypoactive bowel sounds     Genitourinary:   Genitourinary Comments: Mark in place draining clear yellow urine           Musculoskeletal: Moves all extremeties. She exhibits no edema.   TEDs/SCDs in place       Neurological: She is alert and oriented to person, place, and time.    Skin: Skin is warm and dry.    Psychiatric: She has a normal mood and affect. Her behavior is normal.       Lines/Drains/Airways     Drain                 Urethral Catheter 11/30/17 0750 Straight-tip;Non-latex 16 Fr. less than 1 day          Peripheral Intravenous Line                 Peripheral IV - Single Lumen 11/30/17 0623 Right Forearm less than 1 day         Peripheral IV - Single Lumen 11/30/17 0724 Left Hand less than 1 day                Laboratory:  None new      Assessment/Plan:     * Endometrial carcinoma    - indication for surgery  - frozen pathology showed FIGO grade II  -  staging performed, final pathology pending  - further management based on path results        S/P robot assist total hysterectomy and bilateral salpingo-oophorectomy, with staging    Postoperative care  - Pain control with ibuprofen q6, percocet prn and IV dilaudid for breakthrough pain  - Clear liquid diet, advance as tolerated to regular  - Zofran/phenergan prn  - Maintain brown catheter overnight, plan to remove in AM  - Simethicone prn.  - Encourage ambulation and IS use  - TEDs/SCDs for DVT ppx  - CBC in AM        Essential hypertension    - coreg per home regimen  - hold home norvasc and diovan  - BP: (119-142)/() 142/70, continue to monitor            VTE Risk Mitigation         Ordered     Medium Risk of VTE  Once      11/30/17 1054     Place GEORGES hose  Until discontinued      11/30/17 1054     Place sequential compression device  Until discontinued      11/30/17 1054          Was brown catheter removed? No: immediate postop pelvic surgery    Madelaine Martinez MD  Gynecologic Oncology  [facility]

## 2017-11-30 NOTE — TRANSFER OF CARE
"Anesthesia Transfer of Care Note    Patient: Brittany Oates    Procedure(s) Performed: Procedure(s) (LRB):  ROBOTIC ASSISTED LAPAROSCOPIC HYSTERECTOMY (N/A)  [id number] (Bilateral)  DISSECTION-LYMPH NODE - pelvic and periaortic  (N/A)    Patient location: PACU    Anesthesia Type: general    Transport from OR: Transported from OR on 6-10 L/min O2 by face mask with adequate spontaneous ventilation    Post pain: adequate analgesia    Post assessment: no apparent anesthetic complications    Post vital signs: stable    Level of consciousness: responds to stimulation    Nausea/Vomiting: no nausea/vomiting    Complications: none    Transfer of care protocol was followed      Last vitals:   Visit Vitals  BP (!) 136/103 (BP Location: Left arm, Patient Position: Lying)   Pulse (!) 55   Temp 36.4 °C (97.6 °F) (Axillary)   Resp 20   Ht 4' 11" (1.499 m)   Wt 72.6 kg (160 lb)   SpO2 100%   BMI 32.32 kg/m²     "

## 2017-11-30 NOTE — HOSPITAL COURSE
11/30/2017 - Patient underwent RATLH/BSO/BP and PA LND staging as planned, uncomplicated. See Op note for details. Frozen pathology consistent with FIGO grade II. No acute issues.  12/01/2017 - POD 1. Postoperative course was uncomplicated, and patient made routine advances as anticipated. Patient is meeting all postoperative milestones and is ready for discharge. Pain is well-controlled with PO pain medications. Patient is tolerating PO without nausea or vomiting, ambulating and urinating without difficulty. Patient instructed to continue home medications as indicated as well as pain medications as needed, and to follow up with Dr. Hunter in 4 weeks for postoperative visit.

## 2017-11-30 NOTE — BRIEF OP NOTE
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     Estimated Blood Loss: 50 mL  Total Fluids:2000 mml   Urine Output: 300 ml         Specimens:   Specimen (12h ago through future)    Start     Ordered    11/30/17 1027  Specimen to Pathology - Surgery  Once     Comments:  1. Uterus, cervix, Bilateral tubes and ovaries - sent for frozen2. Right Pelvic lymph nodes- Perm3. Right Obturator Lymph nodes -Perm4. Left pelvic lymp nodes _Perm5. Left Obturator lymph nodes -Perm6. ParaAortic lymph nodes -perm      11/30/17 1028

## 2017-11-30 NOTE — ANESTHESIA POSTPROCEDURE EVALUATION
"Anesthesia Post Evaluation    Patient: Brittany Oates    Procedure(s) Performed: Procedure(s) (LRB):  ROBOTIC ASSISTED LAPAROSCOPIC HYSTERECTOMY (N/A)  [id number] (Bilateral)  DISSECTION-LYMPH NODE - pelvic and periaortic  (N/A)    Final Anesthesia Type: general  Patient location during evaluation: PACU  Patient participation: Yes- Able to Participate  Level of consciousness: awake and alert and awake  Post-procedure vital signs: reviewed and stable  Pain management: adequate  Airway patency: patent  PONV status at discharge: No PONV  Anesthetic complications: no      Cardiovascular status: blood pressure returned to baseline  Respiratory status: unassisted and spontaneous ventilation  Hydration status: euvolemic  Follow-up not needed.        Visit Vitals  /77   Pulse (!) 58   Temp 36.7 °C (98.1 °F) (Oral)   Resp 11   Ht 4' 11" (1.499 m)   Wt 72.6 kg (160 lb)   SpO2 98%   BMI 32.32 kg/m²       Pain/Selina Score: Pain Assessment Performed: Yes (11/30/2017  1:16 PM)  Presence of Pain: denies (11/30/2017  1:16 PM)  Pain Rating Prior to Med Admin: 0 (11/30/2017 12:07 PM)  Selina Score: 10 (11/30/2017  1:16 PM)      "

## 2017-11-30 NOTE — HPI
61 yo with PMHx significant for HTN presented as scheduled for RATLH/BSO/BP and PA LND staging for endometrial cancer, poorly differentiated. No complaints on admission.

## 2017-11-30 NOTE — ASSESSMENT & PLAN NOTE
- coreg per home regimen  - hold home norvasc and diovan  - BP: (119-142)/() 142/70, continue to monitor

## 2017-11-30 NOTE — ASSESSMENT & PLAN NOTE
Postoperative care  - Pain control with ibuprofen q6, percocet prn and IV dilaudid for breakthrough pain  - Clear liquid diet, advance as tolerated to regular  - Zofran/phenergan prn  - Maintain brown catheter overnight, plan to remove in AM  - Simethicone prn.  - Encourage ambulation and IS use  - TEDs/SCDs for DVT ppx  - CBC in AM

## 2017-11-30 NOTE — ASSESSMENT & PLAN NOTE
- indication for surgery  - frozen pathology showed FIGO grade II  - staging performed, final pathology pending  - further management based on path results

## 2017-11-30 NOTE — INTERVAL H&P NOTE
The patient has been examined and the H&P has been reviewed:    EMBx pathology shoed endometrial carcinoma, FIGO grade 3. Plan to remove BP and PA lymph nodes.  Clarification that patient has sickle cell TRAIT not sickle cell anemia/disease. Denies ever having a pain crisis.    Active Hospital Problems    Diagnosis  POA    Endometrial carcinoma [C54.1]  Yes      Resolved Hospital Problems    Diagnosis Date Resolved POA   No resolved problems to display.     Madelaine Martinez MD  OBGYN - PGY 3

## 2017-11-30 NOTE — OR NURSING
Rotbot time out completed with pre incision time out.  All DaVinci instruments inspected before case by zackery  .  All instruments appear to be intact.

## 2017-11-30 NOTE — NURSING TRANSFER
Nursing Transfer Note      11/30/2017     Transfer To: 510a    Transfer via stretcher    Transfer with O2, IVF infusing    Transported by PCT    Medicines sent: None    Chart send with patient: Yes    Notified: daughter    Patient reassessed at: 11/30/17 1515  Upon arrival to floor: patient oriented to room, call bell in reach and bed in lowest position

## 2017-12-01 VITALS
TEMPERATURE: 99 F | SYSTOLIC BLOOD PRESSURE: 109 MMHG | RESPIRATION RATE: 16 BRPM | WEIGHT: 160 LBS | DIASTOLIC BLOOD PRESSURE: 56 MMHG | BODY MASS INDEX: 32.25 KG/M2 | HEIGHT: 59 IN | OXYGEN SATURATION: 97 % | HEART RATE: 72 BPM

## 2017-12-01 LAB
BASOPHILS # BLD AUTO: 0.01 K/UL
BASOPHILS NFR BLD: 0.1 %
DIFFERENTIAL METHOD: ABNORMAL
EOSINOPHIL # BLD AUTO: 0 K/UL
EOSINOPHIL NFR BLD: 0 %
ERYTHROCYTE [DISTWIDTH] IN BLOOD BY AUTOMATED COUNT: 14 %
HCT VFR BLD AUTO: 22.4 %
HGB BLD-MCNC: 7.3 G/DL
IMM GRANULOCYTES # BLD AUTO: 0.06 K/UL
IMM GRANULOCYTES NFR BLD AUTO: 0.6 %
LYMPHOCYTES # BLD AUTO: 1.5 K/UL
LYMPHOCYTES NFR BLD: 15.3 %
MCH RBC QN AUTO: 29 PG
MCHC RBC AUTO-ENTMCNC: 32.6 G/DL
MCV RBC AUTO: 89 FL
MONOCYTES # BLD AUTO: 0.8 K/UL
MONOCYTES NFR BLD: 8 %
NEUTROPHILS # BLD AUTO: 7.5 K/UL
NEUTROPHILS NFR BLD: 76 %
NRBC BLD-RTO: 0 /100 WBC
PLATELET # BLD AUTO: 271 K/UL
PMV BLD AUTO: 9.5 FL
RBC # BLD AUTO: 2.52 M/UL
WBC # BLD AUTO: 9.85 K/UL

## 2017-12-01 PROCEDURE — 85025 COMPLETE CBC W/AUTO DIFF WBC: CPT

## 2017-12-01 PROCEDURE — 99024 POSTOP FOLLOW-UP VISIT: CPT | Mod: ,,, | Performed by: OBSTETRICS & GYNECOLOGY

## 2017-12-01 PROCEDURE — 36415 COLL VENOUS BLD VENIPUNCTURE: CPT

## 2017-12-01 PROCEDURE — 25000003 PHARM REV CODE 250: Performed by: STUDENT IN AN ORGANIZED HEALTH CARE EDUCATION/TRAINING PROGRAM

## 2017-12-01 RX ORDER — IBUPROFEN 600 MG/1
600 TABLET ORAL EVERY 6 HOURS PRN
Qty: 30 TABLET | Refills: 1 | Status: SHIPPED | OUTPATIENT
Start: 2017-12-01 | End: 2017-12-01

## 2017-12-01 RX ORDER — OXYCODONE AND ACETAMINOPHEN 5; 325 MG/1; MG/1
1 TABLET ORAL EVERY 4 HOURS PRN
Qty: 30 TABLET | Refills: 0 | Status: SHIPPED | OUTPATIENT
Start: 2017-12-01 | End: 2017-12-29

## 2017-12-01 RX ORDER — IBUPROFEN 600 MG/1
600 TABLET ORAL EVERY 6 HOURS PRN
Qty: 30 TABLET | Refills: 1 | Status: SHIPPED | OUTPATIENT
Start: 2017-12-01 | End: 2017-12-29

## 2017-12-01 RX ADMIN — IBUPROFEN 600 MG: 200 TABLET, FILM COATED ORAL at 05:12

## 2017-12-01 RX ADMIN — DEXTROSE MONOHYDRATE, SODIUM CHLORIDE, AND POTASSIUM CHLORIDE: 50; 4.5; 1.49 INJECTION, SOLUTION INTRAVENOUS at 07:12

## 2017-12-01 RX ADMIN — IBUPROFEN 600 MG: 200 TABLET, FILM COATED ORAL at 12:12

## 2017-12-01 NOTE — ASSESSMENT & PLAN NOTE
Postoperative care  - Pain control with ibuprofen q6, percocet prn and IV dilaudid for breakthrough pain  - Advance diet to regular  - Zofran/phenergan prn  - UOP adequate overnight, passed voiding trial  - Simethicone prn.  - Encourage ambulation and IS use  - TEDs/SCDs for DVT ppx  - H/H 10/31>7/22, VSS, asymptomatic  - anticipate discharge home later today once tolerating regular diet

## 2017-12-01 NOTE — OP NOTE
DATE OF PROCEDURE:  11/30/2017    PREOPERATIVE DIAGNOSIS:  Endometrial carcinoma.    POSTOPERATIVE DIAGNOSIS:  Grade II endometrial carcinoma, frozen section.    PROCEDURES PERFORMED:  1.  Robotic-assisted laparoscopic hysterectomy with bilateral   salpingo-oophorectomy.  2.  Robotic-assisted laparoscopic bilateral pelvic and periaortic   lymphadenectomy.    SURGEON:  Edinson Hunter M.D.    FIRST ASSISTANT:  Madelaine Martinez M.D. (RES)    SECOND ASSISTANT:  Bill Cordova M.D. (RES)    ANESTHESIA:  GETA.    ESTIMATED BLOOD LOSS:  50 mL.    IV FLUIDS:  2000 mL.    URINE OUTPUT:  300 mL.    OPERATIVE HISTORY:  This is a 62-year-old patient referred to me by Dr. Trudy Yu for a biopsy that showed complex hyperplasia with moderate   atypia.  This was done for postmenopausal bleeding.    The patient was seen by me and at the time of evaluation on 11/10/2017, I   performed an endometrial biopsy.  This returned as poorly differentiated   adenocarcinoma.  The patient is brought to the Operating Room for surgical   management.    OPERATIVE FINDINGS:  The uterus is slightly enlarged, sounding to 4 inches.  The   tubes and ovaries were normal. No obvious intraperitoneal disease is visible.  The endometrial cavity has carcinoma present   within it, with frozen section showing a grade II lesion with less than 50%   myometrial invasion.    There are no enlarged or suspicious nodes.    OPERATIVE PROCEDURE:  The patient was brought to the Operating Room and after   induction of general anesthesia, [facility]  The vulva and   vagina were prepped with Betadine scrub and solution.  The abdomen was prepped   with ChloraPrep and the patient was sterilely draped.    After timeout, a Mark catheter was placed.  The cervix was exposed with two   right angle retractors.  Cervix was grasped with single-tooth tenaculum.  Uterus   and cervix sounded to 4 inches.  Cervix was dilated.  A 0 Vicryl stitch was   placed at  6 o'clock and 12 o'clock.  A large VCare uterine manipulator was   advanced into the endometrial cavity.  The cervical cup was advanced over the   cervical sutures.  The vaginal occluder was advanced and the entire system was   locked in place.    At this point, we changed gloves and attention was now directed to the abdomen.    An incision was made above the umbilicus for the camera port.  A Veress needle   was inserted.  We did not have evidence for a pneumoperitoneum on the first pass   of the needle and it was removed and reintroduced with negative water droplet   test and a low opening pressure of 6.  The abdomen was then insufflated to 15   mmHg.    A 12 mm Optiview port was then inserted into the abdominal cavity.  A camera was   inserted and there was no evidence of any injury and we were in the peritoneal   cavity.    We then placed three additional trocars in the lower abdomen approximately 10   degrees below and 12 cm lateral to the camera port.  The third was then placed   just above the iliac crest on the left lateral flank.    An 8 mm AirSeal trocar was placed in the left upper quadrant under direct   visualization.  Cytologic washings were done.    The patient was then placed in steep Trendelenburg and the robot was docked.    I began on the right hand side.  Right round ligament was cauterized and cut   with the monopolar scissors.  The anterior leaf of the broad ligament was   opened.  The right ureter was identified.  The right infundibulopelvic ligament   was isolated, triply cauterized and cut.  The posterior leaf of the broad   ligament was incised and the uterine vessels were skeletonized after opening of   the anterior cul-de-sac, peritoneum and dissecting the bladder downward off of   the cervical cup.    The round ligament on the left was now transcauterized and transected.  The   anterior leaf of the broad ligament was opened.  The left ureter was identified   and the left infundibulopelvic  ligament was triply cauterized and cut.  The   uterine vessels were skeletonized, cauterized and cut on the left-hand side.    The uterine vessels were skeletonized and cut on the right hand side.  An   anterior colpotomy was made and followed circumferentially around the cervix   until we freed the cervix from the vagina.  Uterus, cervix, tubes and ovaries   were then pulled out through the vagina.  A Mark catheter was then placed into   the vagina to maintain pneumoperitoneum.    I began lymphadenectomy on the right hand side.  The lymph nodes along the right   external iliac artery, vein and hypogastric artery from the bifurcation of the   common iliac artery to the point where the deep circumflex iliac vein crosses   over the external iliac artery was performed.  The obturator space was   developed.  The obturator nerve was identified and those nodes anterior to the   nerve were removed.    An identical lymphadenectomy was then performed on the left-hand side as well.    At this point, both ureters had also been reinspected as we did the node   dissection.    Attention was now directed to the periaortic area.  The ProGrasp was used to   hold the colon out of this area.  The peritoneum overlying the aorta was opened.    The patient had a fair amount of mesenteric fat, but we were able to hold this   back with the suction tip and I proceeded to remove a sample of lymph nodes   along the aortocaval region.    The abdomen was desufflated and reinsufflated and there was no bleeding from   this area.  We then sprayed Tono in this area.    Attention was now directed back down to the pelvis.  We next closed the vagina   with an interrupted 0 Vicryl suture in a Adela stitch on each angle.  The   intervening vagina was then closed with interrupted 0 Vicryl suture.    Abdomen was desufflated and reinsufflated and there was no evidence of any   bleeding.  Tono was sprayed into the pelvis along the sidewalls as  well.    At this point, we undocked the robot.  The trocars were removed under direct   visualization.  The abdomen was desufflated.    The 12 mm camera trocar wa closed with the use of S retractors and a 0   Vicryl suture x2.  The five skin incisions were then closed with a running 4-0   Monocryl suture in a subcuticular closure.    The patient was then awoken and taken to the Recovery Room in stable condition.    Lap, needle, and sponge count was correct.      JASIEL  dd: 11/30/2017 16:31:30 (CST)  td: 11/30/2017 21:27:03 (CST)  Doc ID   #[id number]  Job ID #[id number]    CC:

## 2017-12-01 NOTE — PROGRESS NOTES
[facility]  Gynecologic Oncology  Progress Note      Patient Name: Brittany Oates  MRN: [id number]  Admission Date: 11/30/2017  Hospital Length of Stay: 1 days  Attending Provider: Edinson Hunter MD  Primary Care Provider: Primary Doctor No  Principal Problem: Endometrial carcinoma    Follow-up For: Procedure(s) (LRB):  ROBOTIC ASSISTED LAPAROSCOPIC HYSTERECTOMY (N/A)  [id number] (Bilateral)  DISSECTION-LYMPH NODE - pelvic and periaortic  (N/A)  Post-Operative Day: 1 Day Post-Op  Subjective:      History of Present Illness:  63 yo with PMHx significant for HTN presented as scheduled for RATLH/BSO/BP and PA LND staging for endometrial cancer, poorly differentiated. No complaints on admission.    Hospital Course:  11/30/2017 - Patient underwent RATLH/BSO/BP and PA LND staging as planned, uncomplicated. See Op note for details. Frozen pathology consistent with FIGO grade II. No acute issues.  12/01/2017 - POD 1. Progressing well postoperatively, no issues.      Hospital course:  11/30/2017 - Patient underwent RATLH/BSO/BP and PA LND staging as planned, uncomplicated. See Op note for details. Frozen pathology consistent with FIGO grade II. No acute issues.  12/01/2017 - POD 1. Progressing well postoperatively, no issues.    Interval History: POD 1. No acute issues overnight. Pain is well controlled with PO medications. Tolerating clear liquid diet without nausea or vomiting. Has ambulated and voided since brown removal this am. Denies flatus or BM. No other complaints at this time.    Scheduled Meds:   carvedilol  25 mg Oral BID WM     Continuous Infusions:   dextrose 5 % and 0.45 % NaCl with KCl 20 mEq 100 mL/hr at 12/01/17 0717     PRN Meds:HYDROmorphone, ondansetron, ondansetron, oxyCODONE-acetaminophen, oxyCODONE-acetaminophen, simethicone, sodium chloride 0.9%    Review of patient's allergies indicates:  No Known Allergies    Objective:     Vital Signs (Most  Recent):  Temp: 98.5 °F (36.9 °C) (12/01/17 0457)  Pulse: 85 (12/01/17 0514)  Resp: 18 (12/01/17 0514)  BP: (!) 100/58 (12/01/17 0514)  SpO2: 96 % (12/01/17 0514) Vital Signs (24h Range):  Temp:  [97.3 °F (36.3 °C)-99.6 °F (37.6 °C)] 98.5 °F (36.9 °C)  Pulse:  [52-92] 85  Resp:  [11-20] 18  SpO2:  [91 %-100 %] 96 %  BP: ()/() 100/58     Weight: 72.6 kg (160 lb)  Body mass index is 32.32 kg/m².    Intake/Output - Last 3 Shifts       11/29 0700 - 11/30 0659 11/30 0700 - 12/01 0659 12/01 0700 - 12/02 0659    P.O.  220     I.V. (mL/kg)  2734 (37.7)     Total Intake(mL/kg)  2954 (40.7)     Urine (mL/kg/hr)  2360 (1.4) 200 (2.9)    Blood  50 (0)     Total Output   2410 200    Net   +544 -200                  Physical Exam:   Constitutional: She is oriented to person, place, and time. She appears well-developed and well-nourished. No distress.       Cardiovascular: Normal rate and regular rhythm.     Pulmonary/Chest: Effort normal. No respiratory distress.        Abdominal: Soft. She exhibits abdominal incision (port sites with steri-strips, c/d/i). She exhibits no distension. There is tenderness (mild, appropriate). There is no rebound and no guarding.   Hypoactive bowel sounds             Musculoskeletal: Moves all extremeties. She exhibits no edema.   TEDs/SCDs in place       Neurological: She is alert and oriented to person, place, and time.    Skin: Skin is warm and dry.    Psychiatric: She has a normal mood and affect. Her behavior is normal.       Lines/Drains/Airways     Peripheral Intravenous Line                 Peripheral IV - Single Lumen 11/30/17 0623 Right Forearm 1 day         Peripheral IV - Single Lumen 11/30/17 0724 Left Hand 1 day                Laboratory:    Recent Labs  Lab 12/01/17  0603   WBC 9.85   HGB 7.3*   HCT 22.4*   MCV 89             Assessment/Plan:     * Endometrial carcinoma    - indication for surgery  - frozen pathology showed FIGO grade II  - staging performed, final  pathology pending  - further management based on path results        S/P robot assist total hysterectomy and bilateral salpingo-oophorectomy, with staging    Postoperative care  - Pain control with ibuprofen q6, percocet prn and IV dilaudid for breakthrough pain  - Advance diet to regular  - Zofran/phenergan prn  - UOP adequate overnight, passed voiding trial  - Simethicone prn.  - Encourage ambulation and IS use  - TEDs/SCDs for DVT ppx  - H/H 10/31>7/22, VSS, asymptomatic  - anticipate discharge home later today once tolerating regular diet          Essential hypertension    - coreg per home regimen  - hold home norvasc and diovan  - BP: ()/() 100/58, continue to monitor            VTE Risk Mitigation         Ordered     Medium Risk of VTE  Once      11/30/17 1054     Place GEORGES hose  Until discontinued      11/30/17 1054     Place sequential compression device  Until discontinued      11/30/17 1054          Was brown catheter removed? Yes    Madelaine Martinez MD  Gynecologic Oncology  [facility]

## 2017-12-01 NOTE — DISCHARGE SUMMARY
[facility]  Gynecologic Oncology  Discharge Summary    Patient Name: Brittany Oates  MRN: [id number]  Admission Date: 11/30/2017  Hospital Length of Stay: 1 days  Discharge Date and Time:  12/01/2017 1:04 PM  Attending Physician: Edinson Hunter MD   Discharging Provider: Madelaine Martinez MD  Primary Care Provider: Primary Doctor Laura    HPI:   61 yo with PMHx significant for HTN presented as scheduled for RATLH/BSO/BP and PA LND staging for endometrial cancer, poorly differentiated. No complaints on admission.    Hospital Course:  11/30/2017 - Patient underwent RATLH/BSO/BP and PA LND staging as planned, uncomplicated. See Op note for details. Frozen pathology consistent with FIGO grade II. No acute issues.  12/01/2017 - POD 1. Postoperative course was uncomplicated, and patient made routine advances as anticipated. Patient is meeting all postoperative milestones and is ready for discharge. Pain is well-controlled with PO pain medications. Patient is tolerating PO without nausea or vomiting, ambulating and urinating without difficulty. Patient instructed to continue home medications as indicated as well as pain medications as needed, and to follow up with Dr. Hunter in 4 weeks for postoperative visit.        Procedure(s) (LRB):  ROBOTIC ASSISTED LAPAROSCOPIC HYSTERECTOMY (N/A)  [id number] (Bilateral)  DISSECTION-LYMPH NODE - pelvic and periaortic  (N/A)         Significant Diagnostic Studies: Labs:   CBC   Recent Labs  Lab 12/01/17  0603   WBC 9.85   HGB 7.3*   HCT 22.4*          Pending Diagnostic Studies:     None        Final Active Diagnoses:    Diagnosis Date Noted POA    PRINCIPAL PROBLEM:  Endometrial carcinoma [C54.1] 11/09/2017 Yes    S/P robot assist total hysterectomy and bilateral salpingo-oophorectomy, with staging [Z90.710, Z90.79, Z90.722] 11/30/2017 Not Applicable    Essential hypertension [I10] 11/30/2017 Yes      Problems Resolved During this  Admission:    Diagnosis Date Noted Date Resolved POA        Does this patient meet criteria for extended DVT prophylaxis? No, because procedure laparoscopic    Discharged Condition: fair    Disposition: Home or Self Care    Follow Up:  Follow-up Information     Edinson Hunter MD. Schedule an appointment as soon as possible for a visit in 4 weeks.    Specialty:  Gynecologic Oncology  Why:  postoperative visit  Contact information:  [address]  [facility] [address]  [phone]                 Patient Instructions:     Diet general     Activity as tolerated     Other restrictions (specify):   Order Comments: No heavy lifting or heavy housework, no exercise more than walking, no intercourse, and nothing in the vagina until your follow up visit in about 4 weeks.     Call MD for:  temperature >100.4     Call MD for:  persistent nausea and vomiting or diarrhea     Call MD for:  severe uncontrolled pain     Call MD for:  redness, tenderness, or signs of infection (pain, swelling, redness, odor or green/yellow discharge around incision site)     Call MD for:  difficulty breathing or increased cough     Call MD for:  severe persistent headache     Call MD for:  persistent dizziness, light-headedness, or visual disturbances     Call MD for:   Order Comments: Heavy vaginal bleeding saturating more than 1 pad per hr for at least consecutive 2 hrs.     No dressing needed   Order Comments: Wash incisions with soap/water daily, dry completely. Remove steri-strips 1 week from surgery       Medications:  Reconciled Home Medications:   Current Discharge Medication List      START taking these medications    Details   ibuprofen (ADVIL,MOTRIN) 600 MG tablet Take 1 tablet (600 mg total) by mouth every 6 (six) hours as needed for Pain.  Qty: 30 tablet, Refills: 1    Associated Diagnoses: S/P total hysterectomy and bilateral salpingo-oophorectomy      oxyCODONE-acetaminophen (PERCOCET) 5-325 mg per tablet Take 1 tablet by  mouth every 4 (four) hours as needed for Pain.  Qty: 30 tablet, Refills: 0    Associated Diagnoses: S/P total hysterectomy and bilateral salpingo-oophorectomy         CONTINUE these medications which have NOT CHANGED    Details   amLODIPine (NORVASC) 10 MG tablet Take 10 mg by mouth once daily.      carvedilol (COREG) 25 MG tablet Take 25 mg by mouth 2 (two) times daily with meals.      valsartan-hydrochlorothiazide (DIOVAN-HCT) 320-25 mg per tablet Take 1 tablet by mouth once daily.         STOP taking these medications       medroxyPROGESTERone (PROVERA) 10 MG tablet Comments:   Reason for Stopping:               Madelaine Martinez MD  Gynecologic Oncology  [facility]

## 2017-12-01 NOTE — PLAN OF CARE
Patient will d/c home today.  No d/c needs anticipated.       12/01/17 1357   Final Note   Assessment Type Final Discharge Note   Discharge Disposition Home   What phone number can be called within the next 1-3 days to see how you are doing after discharge? ([phone])   Discharge plans and expectations educations in teach back method with documentation complete? Yes

## 2017-12-01 NOTE — SUBJECTIVE & OBJECTIVE
Hospital course:  11/30/2017 - Patient underwent RATLH/BSO/BP and PA LND staging as planned, uncomplicated. See Op note for details. Frozen pathology consistent with FIGO grade II. No acute issues.  12/01/2017 - POD 1. Progressing well postoperatively, no issues.    Interval History: POD 1. No acute issues overnight. Pain is well controlled with PO medications. Tolerating clear liquid diet without nausea or vomiting. Has ambulated and voided since brown removal this am. Denies flatus or BM. No other complaints at this time.    Scheduled Meds:   carvedilol  25 mg Oral BID WM     Continuous Infusions:   dextrose 5 % and 0.45 % NaCl with KCl 20 mEq 100 mL/hr at 12/01/17 0717     PRN Meds:HYDROmorphone, ondansetron, ondansetron, oxyCODONE-acetaminophen, oxyCODONE-acetaminophen, simethicone, sodium chloride 0.9%    Review of patient's allergies indicates:  No Known Allergies    Objective:     Vital Signs (Most Recent):  Temp: 98.5 °F (36.9 °C) (12/01/17 0457)  Pulse: 85 (12/01/17 0514)  Resp: 18 (12/01/17 0514)  BP: (!) 100/58 (12/01/17 0514)  SpO2: 96 % (12/01/17 0514) Vital Signs (24h Range):  Temp:  [97.3 °F (36.3 °C)-99.6 °F (37.6 °C)] 98.5 °F (36.9 °C)  Pulse:  [52-92] 85  Resp:  [11-20] 18  SpO2:  [91 %-100 %] 96 %  BP: ()/() 100/58     Weight: 72.6 kg (160 lb)  Body mass index is 32.32 kg/m².    Intake/Output - Last 3 Shifts       11/29 0700 - 11/30 0659 11/30 0700 - 12/01 0659 12/01 0700 - 12/02 0659    P.O.  220     I.V. (mL/kg)  2734 (37.7)     Total Intake(mL/kg)  2954 (40.7)     Urine (mL/kg/hr)  2360 (1.4) 200 (2.9)    Blood  50 (0)     Total Output   2410 200    Net   +544 -200                  Physical Exam:   Constitutional: She is oriented to person, place, and time. She appears well-developed and well-nourished. No distress.       Cardiovascular: Normal rate and regular rhythm.     Pulmonary/Chest: Effort normal. No respiratory distress.        Abdominal: Soft. She exhibits abdominal  incision (port sites with steri-strips, c/d/i). She exhibits no distension. There is tenderness (mild, appropriate). There is no rebound and no guarding.   Hypoactive bowel sounds             Musculoskeletal: Moves all extremeties. She exhibits no edema.   TEDs/SCDs in place       Neurological: She is alert and oriented to person, place, and time.    Skin: Skin is warm and dry.    Psychiatric: She has a normal mood and affect. Her behavior is normal.       Lines/Drains/Airways     Peripheral Intravenous Line                 Peripheral IV - Single Lumen 11/30/17 0623 Right Forearm 1 day         Peripheral IV - Single Lumen 11/30/17 0724 Left Hand 1 day                Laboratory:    Recent Labs  Lab 12/01/17  0603   WBC 9.85   HGB 7.3*   HCT 22.4*   MCV 89

## 2017-12-01 NOTE — ASSESSMENT & PLAN NOTE
- coreg per home regimen  - hold home norvasc and diovan  - BP: ()/() 100/58, continue to monitor

## 2017-12-04 LAB
BLD PROD TYP BPU: NORMAL
BLOOD UNIT EXPIRATION DATE: NORMAL
BLOOD UNIT TYPE CODE: 6200
BLOOD UNIT TYPE: NORMAL
CODING SYSTEM: NORMAL
DISPENSE STATUS: NORMAL
NUM UNITS TRANS PACKED RBC: NORMAL

## 2017-12-07 ENCOUNTER — TELEPHONE (OUTPATIENT)
Dept: HEMATOLOGY/ONCOLOGY | Facility: CLINIC | Age: 62
End: 2017-12-07

## 2017-12-07 ENCOUNTER — TELEPHONE (OUTPATIENT)
Dept: GYNECOLOGIC ONCOLOGY | Facility: CLINIC | Age: 62
End: 2017-12-07

## 2017-12-07 NOTE — TELEPHONE ENCOUNTER
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 Negative pelvic and PA nodes. Negative cytology. 7 mm of invasion. Tumor size of 6 cm. I have recommended that she see radiation oncology for consideration for VBT.

## 2017-12-29 ENCOUNTER — OFFICE VISIT (OUTPATIENT)
Dept: GYNECOLOGIC ONCOLOGY | Facility: CLINIC | Age: 62
End: 2017-12-29
Payer: COMMERCIAL

## 2017-12-29 VITALS
HEART RATE: 84 BPM | WEIGHT: 163 LBS | DIASTOLIC BLOOD PRESSURE: 70 MMHG | SYSTOLIC BLOOD PRESSURE: 138 MMHG | BODY MASS INDEX: 32.92 KG/M2

## 2017-12-29 DIAGNOSIS — Z90.79 S/P TOTAL HYSTERECTOMY AND BILATERAL SALPINGO-OOPHORECTOMY: ICD-10-CM

## 2017-12-29 DIAGNOSIS — Z90.722 S/P TOTAL HYSTERECTOMY AND BILATERAL SALPINGO-OOPHORECTOMY: ICD-10-CM

## 2017-12-29 DIAGNOSIS — Z90.710 S/P TOTAL HYSTERECTOMY AND BILATERAL SALPINGO-OOPHORECTOMY: ICD-10-CM

## 2017-12-29 DIAGNOSIS — C54.1 ENDOMETRIAL CARCINOMA: Primary | ICD-10-CM

## 2017-12-29 PROCEDURE — 99999 PR PBB SHADOW E&M-EST. PATIENT-LVL III: CPT | Mod: PBBFAC,,, | Performed by: OBSTETRICS & GYNECOLOGY

## 2017-12-29 PROCEDURE — 99024 POSTOP FOLLOW-UP VISIT: CPT | Mod: S$GLB,,, | Performed by: OBSTETRICS & GYNECOLOGY

## 2017-12-29 NOTE — PROGRESS NOTES
Subjective:       Patient ID: Brittany Oates is a 62 y.o. female.    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 Negative pelvic and PA nodes. Negative cytology. 2 mm of invasion put of 28 mm of myometrium. Tumor size of 6 cm. I have recommended that she see radiation oncology for consideration for VBT.  Review of Systems   Constitutional: Negative for fatigue and fever.   Genitourinary: Negative for vaginal bleeding.       Objective:   /70   Pulse 84   Wt 73.9 kg (163 lb)   BMI 32.92 kg/m²      Physical Exam   Constitutional: She is oriented to person, place, and time. She appears well-developed and well-nourished.   HENT:   Head: Normocephalic and atraumatic.   Eyes: No scleral icterus.   Abdominal: Soft. She exhibits no distension and no mass. There is no tenderness. There is no rebound and no guarding.   Healing trocar sites    Genitourinary:   Genitourinary Comments: Bimanual exam:  Vulva: no lesions. Normal appearance  Urethra: Normal size and location. No lesions  Bladder: No masses or tenderness.  Vagina: normal mucosa. No lesion. Cuff intact.   Cervix: absent.   Uterus: absent.  Adnexa: no masses.  Rectovaginal: No posterior cul de sac thickening or nodularity.  Rectal: no masses. Nontender. Normal tone.      Musculoskeletal: She exhibits no edema or tenderness.   Neurological: She is alert and oriented to person, place, and time.   Skin: Skin is warm and dry.   Psychiatric: She has a normal mood and affect. Her behavior is normal. Judgment and thought content normal.       Assessment:       1. Endometrial carcinoma    2. 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  Has appt with rad onc to discuss role for VBT    S/P robot  assist total hysterectomy and bilateral salpingo-oophorectomy, with staging

## 2017-12-29 NOTE — LETTER
December 29, 2017        Trudy Yu MD  [address]  [place] [address]             [facility] - GYN Oncology  [address]  [place] [address]  Phone: [phone]   Patient: Brittany Oates   MR Number: [id number]   YOB: 1955   Date of Visit: 12/29/2017       Dear Dr. Yu:    Thank you for referring Brittany Oates to me for evaluation. Attached you will find relevant portions of my assessment and plan of care.    If you have questions, please do not hesitate to call me. I look forward to following Brittany Oates along with you.    Sincerely,      Edinson Hunter MD            CC  No Recipients    Enclosure

## 2018-01-03 ENCOUNTER — INITIAL CONSULT (OUTPATIENT)
Dept: RADIATION ONCOLOGY | Facility: CLINIC | Age: 63
End: 2018-01-03
Payer: COMMERCIAL

## 2018-01-03 VITALS
DIASTOLIC BLOOD PRESSURE: 65 MMHG | HEART RATE: 87 BPM | SYSTOLIC BLOOD PRESSURE: 140 MMHG | BODY MASS INDEX: 32.03 KG/M2 | TEMPERATURE: 98 F | WEIGHT: 158.88 LBS | RESPIRATION RATE: 18 BRPM | HEIGHT: 59 IN

## 2018-01-03 DIAGNOSIS — Z90.722 S/P TOTAL HYSTERECTOMY AND BILATERAL SALPINGO-OOPHORECTOMY: Primary | ICD-10-CM

## 2018-01-03 DIAGNOSIS — Z90.79 S/P TOTAL HYSTERECTOMY AND BILATERAL SALPINGO-OOPHORECTOMY: Primary | ICD-10-CM

## 2018-01-03 DIAGNOSIS — Z90.710 S/P TOTAL HYSTERECTOMY AND BILATERAL SALPINGO-OOPHORECTOMY: Primary | ICD-10-CM

## 2018-01-03 PROCEDURE — 99204 OFFICE O/P NEW MOD 45 MIN: CPT | Mod: S$GLB,,, | Performed by: RADIOLOGY

## 2018-01-03 PROCEDURE — 99999 PR PBB SHADOW E&M-EST. PATIENT-LVL III: CPT | Mod: PBBFAC,,, | Performed by: RADIOLOGY

## 2018-01-03 NOTE — PROGRESS NOTES
REFERRING PHYSICIAN:   Edinson Hunter M.D.    DIAGNOSIS:    FIGO grade 3, stage IA, endometrial cancer    HISTORY OF PRESENT ILLNESS:   Ms. Oates is a 62-year-old female who was recently diagnosed with endometrial cancer after evaluation for postmenopausal bleeding.  An endometrial biopsy on November 10, 2017 revealed endometrial carcinoma, poorly differentiated, FIGO grade 3.  On 2017, she underwent robotic-assisted MILLIE/BSO and lymph node sampling.  The pathology revealed the uterus with FIGO, grade 3, endometrioid adenocarcinoma measuring 6 cm, in the background of complex hyperplasia with atypia.  There is invasion into 2 mm of myometrium out of a total thickness of 28 mm (7%).  Six pelvic lymph nodes, one para-aortic lymph node, and 6 obturator lymph nodes were negative for involvement.  The pelvic washings were also negative for involvement.  She is here today for recommendation regarding further treatment.      At present, she reports bilateral pedal edema, right greater than left, which has somewhat worsened since surgery.  She denies vaginal bleeding, vaginal discharge, nausea, vomiting, rectal bleeding, rectal pain, dysuria, or hematuria.  She also denies fever, night sweats, or weight loss.  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    REVIEW OF SYSTEMS:  As above.  In addition, patient denies headaches, visual problems, dizziness, chest pain, shortness of breath, cough, nausea, vomiting, diarrhea, or any new bony pains. Patient also denies easy bruising, skin rashes, or numbness or tingling.    GYN HISTORY:     ECO    PAST MEDICAL HISTORY:  Past Medical History:   Diagnosis Date    Anemia     Complex atypical endometrial hyperplasia 2017    Essential hypertension     Sickle cell trait        PAST SURGICAL HISTORY:  Past Surgical History:   Procedure Laterality Date    HYSTERECTOMY      OH REMOVAL OF OVARY/TUBE(S)         ALLERGIES:   Review of patient's allergies  indicates:  No Known Allergies    MEDICATIONS:  Current Outpatient Prescriptions   Medication Sig    amLODIPine (NORVASC) 10 MG tablet Take 10 mg by mouth once daily.    carvedilol (COREG) 25 MG tablet Take 25 mg by mouth 2 (two) times daily with meals.    valsartan-hydrochlorothiazide (DIOVAN-HCT) 320-25 mg per tablet Take 1 tablet by mouth once daily.     No current facility-administered medications for this visit.        SOCIAL HISTORY:  Social History     Social History    Marital status:      Spouse name: N/A    Number of children: N/A    Years of education: N/A     Occupational History    Not on file.     Social History Main Topics    Smoking status: Never Smoker    Smokeless tobacco: Never Used    Alcohol use No    Drug use: Unknown    Sexual activity: Not on file     Other Topics Concern    Not on file     Social History Narrative    No narrative on file       FAMILY HISTORY:  Family History   Problem Relation Age of Onset    Colon cancer Mother     Breast cancer Mother     Cancer Father     Breast cancer Sister      in her 50s    Ovarian cancer Neg Hx     Uterine cancer Neg Hx          PHYSICAL EXAMINATION:  Vitals:    01/03/18 0920   BP: (!) 140/65   Pulse: 87   Resp: 18   Temp: 97.8 °F (36.6 °C)     GENERAL: Patient is alert and oriented, in no acute distress.  HEENT:Extraocular muscles are intact.  Oropharynx is clear without lesions.  There is no cervical or supraclavicular lymphadenopathy palpated.  No thyromegaly noted.  HEART: Regular rate and rhythm.  LUNGS: Clear to auscultation bilaterally.  ABDOMEN:Soft, nontender, nondistended, without hepatosplenomegaly.  Normoactive bowel sounds.  Trocar sites without signs of infection.  PELVIC EXAM: A speculum examination reveals the vaginal cuff and vagina without suspicious masses or lesions.    EXTREMITIES: No clubbing, cyanosis, or edema.  NEUROLOGICAL: Cranial nerve II through XII grossly intact.  Sensation is intact.   Strength is 5 out of 5 in the upper and lower extremities bilaterally.       ASSESSMENT:   This is a 62-year-old female with FIGO grade 3, stage IA, endometrial carcinoma who underwent MILLIE/BSO on November 30, 2017 with involvement of 2 mm of myometrium out of a total thickness of 28 mm.    PLAN:   After review of the pathology, Ms. Oates is recommended to undergo adjuvant radiation to the vaginal cuff to reduce her chance of local recurrence.  I plan to deliver approximately 2100 cGy in 3 fractions to the vaginal cuff and upper vagina.    The risks, benefits, and side effects of radiation were explained in detail to the patient and her 2 daughters.  All questions were answered and informed consent was signed.  I plan to see the patient back for radiation planning CT in approximately 1- 2 weeks.    Psychosocial Distress screening score of Distress Score: 0 noted and reviewed. No intervention indicated.    I spent approximately 60 minutes reviewing the available records and evaluating the patient, out of which over 50% of the time was spent face to face with the patient in counseling and coordinating this patient's care.

## 2018-01-03 NOTE — LETTER
January 3, 2018      Edinson Hunter MD  [address]  [facility] [address]           [facility]  [address]  [facility] [address]  Phone: [phone]          Patient: Brittany Oates   MR Number: [id number]   YOB: 1955   Date of Visit: 1/3/2018       Dear Dr. Edinson Hunter:    Thank you for referring Brittany Oates to me for evaluation. Attached you will find relevant portions of my assessment and plan of care.    If you have questions, please do not hesitate to call me. I look forward to following Brittany Oates along with you.    Sincerely,    Denice Lal MD    Enclosure  CC:  No Recipients    If you would like to receive this communication electronically, please contact [email or web address] or [phone] to request more information on [employer] access.    For providers and/or their staff who would like to refer a patient to [facility], please contact us through our one-stop-shop provider referral line, [facility], at [phone].    If you feel you have received this communication in error or would no longer like to receive these types of communications, please e-mail [email or web address]

## 2018-01-03 NOTE — PATIENT INSTRUCTIONS
Radiation Therapy Treatment  Radiation therapy can help you in your fight against cancer. It begins with a session to discuss treatment with your doctor. If you and your doctor decide on radiation, you will return for a simulation. The simulation is a planning session that helps the doctor target your cancer. He or she will design a radiation plan to protect normal tissues. When the simulation and plan are completed, you will begin your daily treatments. Treatment is usually once daily Monday to Friday. It takes less than a half an hour. Sometimes you may need radiation twice a day, with usually 6 hours between treatments. After the course of radiation is complete, you will be scheduled for follow-up appointments. This is to make sure the cancer is under control. The follow-ups will also make sure that any side effects from the treatment are taken care of.  Radiation therapy uses high-energy X-rays to kill cancer cells.   Your treatment planning visit: The simulation  Your radiation therapy team uses a special machine called a simulator to map out your treatment. The simulator is usually an X-ray machine (fluoroscopy), CT scanner, MRI scanner, or PET-CT scanner machine. Laser lights act as guides to help position your body accurately. During this visit:  · The team figures out the best position for your body. They make notes in your chart so youll be placed the same way each time.  · You may use special devices to keep your body correctly positioned and still during treatment. These may include molds, masks, rests, and blocks.  · The team makes ink marks on your skin. These will help you get in the same position for each treatment. Tiny permanent tattoos may also be used.   · Markers such as metal balls or wires may be put on or in your body. Sometime these are taped to the skin to help with the imaging process. These work with the X-rays to position your body. The markers are removed when the visit is  over.  After the team has the imaging and data, the information is sent into the computer planning system. Your doctor and the team of physicists and dosimetrists design a treatment field. The field will best target your cancer and how it might spread. It will also help limit radiation to nearby normal tissues.  Your treatments  Each treatment usually takes 10 to 30 minutes. You may need to change into a hospital gown. The radiation therapist puts you in the correct position on the treatment table, then leaves the room. Sometimes you may need more imaging before each treatment. The machine may take digital X-rays or a CT scan to help make sure you are lined up correctly. During treatment, lie as still as you can and breathe normally. You will hear noises coming from the machine. You can talk to the radiation therapist, who watches you from the control room on a TV monitor. After treatment, the therapist will help you off the table. You can then get dressed and go back to your normal activities.  Date Last Reviewed: 1/14/2016 © 2000-2017 [employer]. [address] [place] [address]. All rights reserved. This information is not intended as a substitute for professional medical care. Always follow your healthcare professional's instructions.        Discharge Instructions for Radiation Therapy  Radiation therapy uses high-energy X-rays to kill cancer cells and help you in your fight against cancer. Radiation destroys cancer cells gradually, over time. The goal of therapy is to focus on and kill as many cancer cells as possible. Radiation can also damage or kill some of the normal cells that are closest to the tumor. Damaged normal cells can repair themselves, often within a few days.  Caring for your skin  You should ask your healthcare provider for specific products that he or she recommends for washing and bathing. In general, use a mild nondetergent soap and warm (not hot) water to clean the  "area receiving radiation. Pat the region dry rather than rubbing.  Your healthcare provider may give you products to moisturize the skin and prevent infection. The goal is to prevent cracks or breaks in the skin that may be sensitive from treatment:   · Dont be surprised if your treatment causes skin redness, and a type of "sunburn" over time. Some radiation treatments can cause this.   · Ask your therapy team what lotion to use. Also ask for directions about when and how to apply it.  · Avoid prolonged or direct sunlight on the treated area. Ask your therapy team about using a sunscreen. You do not have to avoid going outside altogether, but must take appropriate precautions.  · Dont remove ink marks unless your radiation therapist says its OK. Dont scrub or use soap on the marks when you wash. Let water run over them and pat them dry.  · Protect your skin from heat or cold. Avoid hot tubs, saunas, heating pads, or ice packs.  · Avoid clothing that causes friction or rubbing on the skin.  Fighting fatigue  Radiation therapy may cause you to feel tired. Your body is working hard to heal and repair itself. To feel better, try these things:  · Do light exercise each day. Take short walks.  · Plan tasks for the times when you tend to have the most energy. Ask for help when you need it.  · Relax before you go to bed. This will help you sleep better. Try reading or listening to soothing music.  Coping with appetite changes  Here are ways to cope:  · Tell your therapy team if you find it hard to eat or you have no appetite. You may be referred to a nutritionist to help you with meal planning.  · Radiation to certain internal sites can cause nausea, depending on the location of treatment. This can affect your appetite. Think of healthy eating as part of your treatment. Try these tips:  ¨ Eat slowly.  ¨ Eat small meals several times a day.  ¨ Eat more food when youre feeling better.  ¨ Ask others to keep you company " when you eat.  ¨ Stock up on easy-to-prepare foods.  ¨ Eat foods high in protein and calories. Your healthcare provider may recommend liquid meal supplements.  ¨ Drink plenty of water and other fluids.  ¨ Ask your healthcare provider before taking any vitamins or over-the-counter supplements. Such products are not regulated by the FDA and can sometimes interfere with your treatments.   Dealing with other side effects  Here are suggestions to deal with other side effects:   · Be prepared for hair loss in the area being treated. The hair loss may be permanent. Be sure to discuss this with your healthcare provider.  · Sip cool water if your mouth or throat becomes dry or sore. Ice chips may also help.  · Tell your healthcare provider if you have diarrhea or constipation. You may be given a special diet.  · If you have trouble swallowing liquids, tell your healthcare provider.  Follow-up  Make a follow-up appointment as directed by your healthcare provider.     When to call your healthcare provider  Call your healthcare provider right away if you have any of the following:  · Unexpected headaches  · Trouble concentrating  · Ongoing fatigue  · Wheezing, shortness of breath, or trouble breathing  · Pain that doesnt go away, especially if its always in the same place  · New or unusual lumps, bumps, or swelling  · Dizziness or lightheadedness  · Unusual rashes, bruises, or bleeding  · Fever of 100.4°F (38°C) or higher, or chills  · Nausea and vomiting  · Diarrhea that doesnt improve with time  · Skin breakdown; significant pain due to skin irritation   Date Last Reviewed: 1/13/2016  © 9041-1241 [employer]. [address] [place] [address], [place] [address]. All rights reserved. This information is not intended as a substitute for professional medical care. Always follow your healthcare professional's instructions.      Return for ct/sim in 2 weeks.

## 2018-01-15 ENCOUNTER — PROCEDURE VISIT (OUTPATIENT)
Dept: RADIATION ONCOLOGY | Facility: CLINIC | Age: 63
End: 2018-01-15
Payer: COMMERCIAL

## 2018-01-15 ENCOUNTER — HOSPITAL ENCOUNTER (OUTPATIENT)
Dept: RADIATION THERAPY | Facility: HOSPITAL | Age: 63
Discharge: HOME | End: 2018-01-15
Attending: RADIOLOGY
Payer: COMMERCIAL

## 2018-01-15 DIAGNOSIS — C54.1 ENDOMETRIAL CARCINOMA: Primary | ICD-10-CM

## 2018-01-15 PROCEDURE — 77334 RADIATION TREATMENT AID(S): CPT | Mod: 26,,, | Performed by: RADIOLOGY

## 2018-01-15 PROCEDURE — 77014 HC CT GUIDANCE RADIATION THERAPY FLDS PLACEMENT: CPT | Mod: TC | Performed by: RADIOLOGY

## 2018-01-15 PROCEDURE — 77263 THER RADIOLOGY TX PLNG CPLX: CPT | Mod: ,,, | Performed by: RADIOLOGY

## 2018-01-15 PROCEDURE — 77334 RADIATION TREATMENT AID(S): CPT | Mod: TC | Performed by: RADIOLOGY

## 2018-01-15 PROCEDURE — 77290 THER RAD SIMULAJ FIELD CPLX: CPT | Mod: TC | Performed by: RADIOLOGY

## 2018-01-15 PROCEDURE — 77290 THER RAD SIMULAJ FIELD CPLX: CPT | Mod: 26,,, | Performed by: RADIOLOGY

## 2018-01-15 NOTE — PROCEDURES
Procedures     Ms. Oates returns today for treatment planning CT prior to start of radiation to the vaginal cuff and upper vagina for endometrial cancer.  Patient was placed supine on the treatment planning CT.  A 3 cm vaginal cylinder was inserted into the vagina in place.  Images were obtained.  The vaginal cylinder was removed without complications.  She will return in approximately one week to start radiation as planned.

## 2018-01-19 PROCEDURE — 77295 3-D RADIOTHERAPY PLAN: CPT | Mod: TC | Performed by: RADIOLOGY

## 2018-01-19 PROCEDURE — 77470 SPECIAL RADIATION TREATMENT: CPT | Mod: 59,TC | Performed by: RADIOLOGY

## 2018-01-19 PROCEDURE — 77370 RADIATION PHYSICS CONSULT: CPT | Performed by: RADIOLOGY

## 2018-01-19 PROCEDURE — 77470 SPECIAL RADIATION TREATMENT: CPT | Mod: 26,59,, | Performed by: RADIOLOGY

## 2018-01-19 PROCEDURE — 77295 3-D RADIOTHERAPY PLAN: CPT | Mod: 26,,, | Performed by: RADIOLOGY

## 2018-01-24 ENCOUNTER — PROCEDURE VISIT (OUTPATIENT)
Dept: RADIATION ONCOLOGY | Facility: CLINIC | Age: 63
End: 2018-01-24
Payer: COMMERCIAL

## 2018-01-24 DIAGNOSIS — C54.1 ENDOMETRIAL CARCINOMA: Primary | ICD-10-CM

## 2018-01-24 PROCEDURE — 77770 HDR RDNCL NTRSTL/ICAV BRCHTX: CPT | Mod: 26,,, | Performed by: RADIOLOGY

## 2018-01-24 PROCEDURE — 57156 INS VAG BRACHYTX DEVICE: CPT | Mod: TC | Performed by: RADIOLOGY

## 2018-01-24 PROCEDURE — 77770 HDR RDNCL NTRSTL/ICAV BRCHTX: CPT | Mod: TC | Performed by: RADIOLOGY

## 2018-01-24 PROCEDURE — 57156 INS VAG BRACHYTX DEVICE: CPT | Mod: 26,,, | Performed by: RADIOLOGY

## 2018-01-24 PROCEDURE — C1717 BRACHYTX, NON-STR,HDR IR-192: HCPCS | Performed by: RADIOLOGY

## 2018-01-24 NOTE — PROCEDURES
Procedures     PROCEDURE NOTE:         REFERRING PHYSICIAN: Edinson Hunter M.D.      DIAGNOSIS:   FIGO grade 3, stage IA, endometrial cancer    Ms. Oates is a 62-year-old female who was recently diagnosed with endometrial cancer after evaluation for postmenopausal bleeding. An endometrial biopsy on November 10, 2017 revealed endometrial carcinoma, poorly differentiated, FIGO grade 3. On November 30, 2017, she underwent robotic-assisted MILLIE/BSO and lymph node sampling. The pathology revealed the uterus with FIGO, grade 3, endometrioid adenocarcinoma measuring 6 cm, in the background of complex hyperplasia with atypia. There is invasion into 2 mm of myometrium out of a total thickness of 28 mm (7%). Six pelvic lymph nodes, one para-aortic lymph node, and 6 obturator lymph nodes were negative for involvement. The pelvic washings were also negative for involvement.  She is recommended to undergo vaginal cuff brachytherapy to reduce her chance of recurrence. She is here today for her first treatment.      DATE OF PROCEDURE: 01/24/2018      PROCEDURE: Intracavitary vaginal HDR #1      AREA TREATED: Vaginal cuff and upper two thirds of the vagina      TREATMENT METHOD: Insertion of 3.0 cm vaginal cylinder into vagina      RADIATION: Iridium 192, high-dose-rate      DEPTH OF CALCULATION:   vaginal (cylinder) surface      DOSE: 7.0 gray      Time Out:   Performed by Leidy Newell RN  Identifiers used: Name and date of birth      She was brought to the HDR delivery room and a 3.0 cm vaginal cylinder was placed into the vagina. The brachytherapy catheter was connected to the cylinder and the treatment was delivered. She received the first out of 3 fractions of HDR vaginal brachytherapy as above. She tolerated the treatment without any unexpected side effects. She will return in 1 week to continue her treatment. I was present during the entire procedure

## 2018-01-31 ENCOUNTER — PROCEDURE VISIT (OUTPATIENT)
Dept: RADIATION ONCOLOGY | Facility: CLINIC | Age: 63
End: 2018-01-31
Payer: COMMERCIAL

## 2018-01-31 DIAGNOSIS — C54.1 ENDOMETRIAL CARCINOMA: Primary | ICD-10-CM

## 2018-01-31 PROCEDURE — 57156 INS VAG BRACHYTX DEVICE: CPT | Mod: 26,,, | Performed by: RADIOLOGY

## 2018-01-31 PROCEDURE — C1717 BRACHYTX, NON-STR,HDR IR-192: HCPCS | Performed by: RADIOLOGY

## 2018-01-31 PROCEDURE — 77770 HDR RDNCL NTRSTL/ICAV BRCHTX: CPT | Mod: TC | Performed by: RADIOLOGY

## 2018-01-31 PROCEDURE — 77770 HDR RDNCL NTRSTL/ICAV BRCHTX: CPT | Mod: 26,,, | Performed by: RADIOLOGY

## 2018-01-31 PROCEDURE — 57156 INS VAG BRACHYTX DEVICE: CPT | Mod: TC | Performed by: RADIOLOGY

## 2018-01-31 NOTE — PROCEDURES
Procedures     PROCEDURE NOTE:         REFERRING PHYSICIAN: Edinson Hunter M.D.      DIAGNOSIS:   FIGO grade 3, stage IA, endometrial cancer     Ms. Oates is a 62-year-old female who was recently diagnosed with endometrial cancer after evaluation for postmenopausal bleeding. An endometrial biopsy on November 10, 2017 revealed endometrial carcinoma, poorly differentiated, FIGO grade 3. On November 30, 2017, she underwent robotic-assisted MILLIE/BSO and lymph node sampling. The pathology revealed the uterus with FIGO, grade 3, endometrioid adenocarcinoma measuring 6 cm, in the background of complex hyperplasia with atypia. There is invasion into 2 mm of myometrium out of a total thickness of 28 mm (7%). Six pelvic lymph nodes, one para-aortic lymph node, and 6 obturator lymph nodes were negative for involvement. The pelvic washings were also negative for involvement.  She is recommended to undergo vaginal cuff brachytherapy to reduce her chance of recurrence. She is here today for her 2nd treatment.      DATE OF PROCEDURE: 01/31/2018      PROCEDURE: Intracavitary vaginal HDR #2      AREA TREATED: Vaginal cuff and upper two thirds of the vagina      TREATMENT METHOD: Insertion of 3.0 cm vaginal cylinder into vagina      RADIATION: Iridium 192, high-dose-rate      DEPTH OF CALCULATION:   vaginal (cylinder) surface      DOSE: 7.0 gray      Time Out:   Performed by Leidy Newell RN  Identifiers used: Name and date of birth      She was brought to the HDR delivery room and a 3.0 cm vaginal cylinder was placed into the vagina. The brachytherapy catheter was connected to the cylinder and the treatment was delivered. She received the 2nd out of 3 fractions of HDR vaginal brachytherapy as above. She tolerated the treatment without any unexpected side effects. She will return in 1 week to continue her treatment. I was present during the entire procedure

## 2018-02-01 ENCOUNTER — HOSPITAL ENCOUNTER (OUTPATIENT)
Dept: RADIATION THERAPY | Facility: HOSPITAL | Age: 63
Discharge: HOME | End: 2018-02-01
Attending: RADIOLOGY
Payer: COMMERCIAL

## 2018-02-05 ENCOUNTER — PROCEDURE VISIT (OUTPATIENT)
Dept: RADIATION ONCOLOGY | Facility: CLINIC | Age: 63
End: 2018-02-05
Payer: COMMERCIAL

## 2018-02-05 DIAGNOSIS — C54.1 ENDOMETRIAL CARCINOMA: Primary | ICD-10-CM

## 2018-02-05 PROCEDURE — 57156 INS VAG BRACHYTX DEVICE: CPT | Mod: 26,,, | Performed by: RADIOLOGY

## 2018-02-05 PROCEDURE — C1717 BRACHYTX, NON-STR,HDR IR-192: HCPCS | Performed by: RADIOLOGY

## 2018-02-05 PROCEDURE — 57156 INS VAG BRACHYTX DEVICE: CPT | Mod: TC | Performed by: RADIOLOGY

## 2018-02-05 PROCEDURE — 77770 HDR RDNCL NTRSTL/ICAV BRCHTX: CPT | Mod: TC | Performed by: RADIOLOGY

## 2018-02-05 PROCEDURE — 77770 HDR RDNCL NTRSTL/ICAV BRCHTX: CPT | Mod: 26,,, | Performed by: RADIOLOGY

## 2018-02-05 NOTE — PROCEDURES
Procedures     PROCEDURE NOTE/  COMPLETION SUMMARY:        REFERRING PHYSICIAN: Edinson Hunter M.D.      DIAGNOSIS:   FIGO grade 3, stage IA, endometrial cancer     Ms. Oates is a 62-year-old female who was recently diagnosed with endometrial cancer after evaluation for postmenopausal bleeding. An endometrial biopsy on November 10, 2017 revealed endometrial carcinoma, poorly differentiated, FIGO grade 3. On November 30, 2017, she underwent robotic-assisted MILLIE/BSO and lymph node sampling. The pathology revealed the uterus with FIGO, grade 3, endometrioid adenocarcinoma measuring 6 cm, in the background of complex hyperplasia with atypia. There is invasion into 2 mm of myometrium out of a total thickness of 28 mm (7%). Six pelvic lymph nodes, one para-aortic lymph node, and 6 obturator lymph nodes were negative for involvement. The pelvic washings were also negative for involvement.  She is recommended to undergo vaginal cuff brachytherapy to reduce her chance of recurrence. She is here today for her third and final treatment.      DATE OF PROCEDURE: 02/5/2018      PROCEDURE: Intracavitary vaginal HDR #3      AREA TREATED: Vaginal cuff and upper two thirds of the vagina      TREATMENT METHOD: Insertion of 3.0 cm vaginal cylinder into vagina      RADIATION: Iridium 192, high-dose-rate      DEPTH OF CALCULATION:   vaginal (cylinder) surface      DOSE: 7.0 gray      Time Out:   Performed by Lula Saldaña RN  Identifiers used: Name and date of birth      She was brought to the HDR delivery room and a 3.0 cm vaginal cylinder was placed into the vagina. The brachytherapy catheter was connected to the cylinder and the treatment was delivered. She received the third and final fraction of HDR vaginal brachytherapy as above. She tolerated the treatment without any unexpected side effects.  I was present during the entire procedure.    COMPLETION SUMMARY:  Dose: 2100 cGy in 3 fractions  Duration: January 24, 2018, January 31,  2018, February 5, 2018  Area treated: Vaginal cuff and upper two thirds of the vagina    She will return to see me in approximately 6 weeks, unless symptoms warrant otherwise.

## 2018-03-19 ENCOUNTER — OFFICE VISIT (OUTPATIENT)
Dept: RADIATION ONCOLOGY | Facility: CLINIC | Age: 63
End: 2018-03-19
Payer: COMMERCIAL

## 2018-03-19 VITALS
HEART RATE: 88 BPM | RESPIRATION RATE: 16 BRPM | TEMPERATURE: 98 F | SYSTOLIC BLOOD PRESSURE: 136 MMHG | HEIGHT: 59 IN | DIASTOLIC BLOOD PRESSURE: 63 MMHG | BODY MASS INDEX: 33.16 KG/M2 | WEIGHT: 164.5 LBS

## 2018-03-19 DIAGNOSIS — C54.1 ENDOMETRIAL CARCINOMA: Primary | ICD-10-CM

## 2018-03-19 PROCEDURE — 99213 OFFICE O/P EST LOW 20 MIN: CPT | Mod: S$GLB,,, | Performed by: RADIOLOGY

## 2018-03-19 PROCEDURE — 99999 PR PBB SHADOW E&M-EST. PATIENT-LVL III: CPT | Mod: PBBFAC,,, | Performed by: RADIOLOGY

## 2018-03-19 NOTE — PROGRESS NOTES
"REFERRING PHYSICIAN: Edinson Hunter M.D.    DIAGNOSIS: FIGO grade 3, stage IA, endometrial cancer    RADIATION COMPLETION SUMMARY:  Total dose: 2100 cGy in 3 fractions  Duration: January 24, 2018, January 31, 2018, February 5, 2018  Area treated: Vaginal cuff and upper two thirds of the vagina    INTERVAL HISTORY:   Ms. Oates is a 63-year-old female who was recently diagnosed with endometrial cancer after evaluation for postmenopausal bleeding. An endometrial biopsy on November 10, 2017 revealed endometrial carcinoma, poorly differentiated, FIGO grade 3. On November 30, 2017, she underwent robotic-assisted MILLIE/BSO and lymph node sampling. The pathology revealed the uterus with FIGO, grade 3, endometrioid adenocarcinoma measuring 6 cm, in the background of complex hyperplasia with atypia. There is invasion into 2 mm of myometrium out of a total thickness of 28 mm (7%). Six pelvic lymph nodes, one para-aortic lymph node, and 6 obturator lymph nodes were negative for involvement. The pelvic washings were also negative for involvement.  To reduce her chance of local recurrence, she received vaginal cuff brachytherapy as above.  He today for a routine follow-up.    At present, she denies vaginal bleeding, vaginal discharge, dysuria, hematuria, nausea, vomiting, or diarrhea.  He is planned to follow-up with Dr. Hunter on March 29, 2018.    PHYSICAL EXAMINATION:  VITAL SINGS: /63   Pulse 88   Temp 97.6 °F (36.4 °C) (Oral)   Resp 16   Ht 4' 11" (1.499 m)   Wt 74.6 kg (164 lb 8 oz)   BMI 33.22 kg/m²   GENERAL: Patient is alert and oriented, in no acute distress.  HEENT: Extraocular muscles are intact.  Oropharynx is clear without lesions.  There is no cervical or supraclavicular adenopathy palpated.    CHEST: Breath sounds clear bilaterally.  No rales.  No rhonchi.  Unlabored respirations.  CARDIOVASCULAR: Normal S1, S2.  Normal rate.  Regular rhythm.  ABDOMEN: Bowel sounds normal.  No tenderness.  No abdominal " distention.  No hepatomegaly.  No splenomegaly.  PELVIC EXAM: A speculum examination reveals well-healed vaginal cuff with no abnormal masses.  EXTREMITIES: No clubbing, cyanosis, edema  NEUROLOGIC: Cranial nerves II through XII are grossly intact.  Sensation is intact.  Strength is 5 out of 5 in the upper and lower extremities bilaterally.    ASSESSMENT:   This a 63-year-old female with FIGO grade 3, stage IA, endometrial cancer who underwent MILLIE/BSO on November 30, 2017 followed by vaginal cuff brachytherapy.    PLAN:   Ms. Oates is recovering from the radiation without any unexpected side effects.  She is planned to see Dr. Hunter next week.  She will continue follow-up with him as planned.  I plan to see her back as needed, unless symptoms warrant otherwise.

## 2018-03-29 ENCOUNTER — OFFICE VISIT (OUTPATIENT)
Dept: GYNECOLOGIC ONCOLOGY | Facility: CLINIC | Age: 63
End: 2018-03-29
Payer: COMMERCIAL

## 2018-03-29 VITALS
BODY MASS INDEX: 32.55 KG/M2 | SYSTOLIC BLOOD PRESSURE: 134 MMHG | HEART RATE: 79 BPM | WEIGHT: 161.19 LBS | DIASTOLIC BLOOD PRESSURE: 81 MMHG

## 2018-03-29 DIAGNOSIS — C54.1 ENDOMETRIAL CARCINOMA: Primary | ICD-10-CM

## 2018-03-29 PROCEDURE — 99999 PR PBB SHADOW E&M-EST. PATIENT-LVL III: CPT | Mod: PBBFAC,,, | Performed by: OBSTETRICS & GYNECOLOGY

## 2018-03-29 PROCEDURE — 99214 OFFICE O/P EST MOD 30 MIN: CPT | Mod: S$GLB,,, | Performed by: OBSTETRICS & GYNECOLOGY

## 2018-03-29 NOTE — PROGRESS NOTES
Subjective:       Patient ID: Brittany Oates is a 63 y.o. female.    Chief Complaint: Follow-up (3 month)    HPI     Patient comes in today for follow up for endometrial cancer.   Denies vaginal bleeding. Completed vaginal cuff radaiton.     Treatment history:   Nov. 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 Negative pelvic and PA nodes. Negative cytology. 2 mm of invasion put of 28 mm of myometrium. Tumor size of 6 cm.  RADIATION COMPLETION SUMMARY:  Total dose: 2100 cGy in 3 fractions  Duration: January 24, 2018, January 31, 2018, February 5, 2018  Area treated: Vaginal cuff and upper two thirds of the vagina  Review of Systems   Constitutional: Negative for chills, fatigue and fever.   Respiratory: Negative for cough, shortness of breath and wheezing.    Cardiovascular: Negative for chest pain, palpitations and leg swelling.   Gastrointestinal: Negative for abdominal pain, constipation, diarrhea, nausea and vomiting.   Genitourinary: Negative for difficulty urinating, dysuria, frequency, genital sores, hematuria, urgency, vaginal bleeding, vaginal discharge and vaginal pain.   Neurological: Negative for weakness.   Hematological: Negative for adenopathy. Does not bruise/bleed easily.   Psychiatric/Behavioral: The patient is not nervous/anxious.        Objective:   /81   Pulse 79   Wt 73.1 kg (161 lb 2.5 oz)   BMI 32.55 kg/m²      Physical Exam   Constitutional: She is oriented to person, place, and time. She appears well-developed and well-nourished.   HENT:   Head: Normocephalic and atraumatic.   Eyes: No scleral icterus.   Neck: Neck supple. No tracheal deviation present. No thyroid mass and no thyromegaly present.   Cardiovascular: Normal rate and regular rhythm.    Pulmonary/Chest: Effort normal and breath sounds normal. She has no wheezes.   Abdominal: Soft. She exhibits no distension and no mass. There is no hepatosplenomegaly. There is no tenderness.  There is no rebound and no guarding.   Genitourinary:   Genitourinary Comments: Bimanual exam:  Vulva: no lesions. Normal appearance  Urethra: Normal size and location. No lesions  Bladder: No masses or tenderness.  Vagina: normal mucosa. No lesion  Cervix: absent.   Uterus: absent.  Adnexa: no masses.  Rectovaginal: Not performed     Musculoskeletal: She exhibits no edema or tenderness.   Lymphadenopathy:     She has no cervical adenopathy.     She has no axillary adenopathy.        Right: No inguinal and no supraclavicular adenopathy present.        Left: No inguinal and no supraclavicular adenopathy present.   Neurological: She is alert and oriented to person, place, and time.   Skin: Skin is warm and dry.   Psychiatric: She has a normal mood and affect. Her behavior is normal. Judgment and thought content normal.       Assessment:       1. Endometrial carcinoma        Plan:   Endometrial carcinoma   TASIA    RTC in 3 months.

## 2018-06-28 ENCOUNTER — OFFICE VISIT (OUTPATIENT)
Dept: GYNECOLOGIC ONCOLOGY | Facility: CLINIC | Age: 63
End: 2018-06-28
Payer: COMMERCIAL

## 2018-06-28 VITALS
HEIGHT: 59 IN | BODY MASS INDEX: 33.29 KG/M2 | HEART RATE: 72 BPM | WEIGHT: 165.13 LBS | SYSTOLIC BLOOD PRESSURE: 143 MMHG | DIASTOLIC BLOOD PRESSURE: 78 MMHG

## 2018-06-28 DIAGNOSIS — Z12.89 ENCOUNTER FOR PELVIC SCREENING FOR CANCER: ICD-10-CM

## 2018-06-28 DIAGNOSIS — Z12.31 SCREENING MAMMOGRAM, ENCOUNTER FOR: ICD-10-CM

## 2018-06-28 DIAGNOSIS — C54.1 ENDOMETRIAL CARCINOMA: Primary | ICD-10-CM

## 2018-06-28 DIAGNOSIS — Z01.419 WELL WOMAN EXAM: ICD-10-CM

## 2018-06-28 DIAGNOSIS — Z80.3 FAMILY HISTORY OF BREAST CANCER: ICD-10-CM

## 2018-06-28 PROCEDURE — 99397 PER PM REEVAL EST PAT 65+ YR: CPT | Mod: S$GLB,,, | Performed by: OBSTETRICS & GYNECOLOGY

## 2018-06-28 PROCEDURE — 99999 PR PBB SHADOW E&M-EST. PATIENT-LVL III: CPT | Mod: PBBFAC,,, | Performed by: OBSTETRICS & GYNECOLOGY

## 2018-06-28 NOTE — PROGRESS NOTES
"Subjective:       Patient ID: Brittany Oates is a 63 y.o. female.    Chief Complaint: Endometrial Cancer (3 month follow up) and Well Woman    HPI     Patient comes in today for a WWE and  follow up for endometrial cancer. She has no GYN complaints today.  Denies vaginal bleeding. Completed vaginal cuff radiation 2/2018.    MMG: Jace, > 1 yr ago  Colonoscopy: never     Treatment history:   Nov. 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 Negative pelvic and PA nodes. Negative cytology. 2 mm of invasion put of 28 mm of myometrium. Tumor size of 6 cm.  RADIATION COMPLETION SUMMARY:  Total dose: 2100 cGy in 3 fractions  Duration: January 24, 2018, January 31, 2018, February 5, 2018  Area treated: Vaginal cuff and upper two thirds of the vagina    Review of Systems   Constitutional: Negative for appetite change, chills, diaphoresis, fatigue, fever and unexpected weight change.   HENT: Negative for mouth sores and tinnitus.    Respiratory: Negative for cough, chest tightness, shortness of breath and wheezing.    Cardiovascular: Negative for chest pain, palpitations and leg swelling.   Gastrointestinal: Negative for abdominal distention, abdominal pain, blood in stool, constipation, diarrhea, nausea and vomiting.   Genitourinary: Negative for difficulty urinating, dyspareunia, dysuria, flank pain, frequency, hematuria, pelvic pain, vaginal bleeding, vaginal discharge and vaginal pain.   Musculoskeletal: Negative for arthralgias and back pain.   Skin: Negative for color change and rash.   Neurological: Negative for dizziness, weakness, numbness and headaches.   Hematological: Negative for adenopathy.   Psychiatric/Behavioral: Negative for confusion and sleep disturbance. The patient is not nervous/anxious.        Objective:   BP (!) 143/78   Pulse 72   Ht 4' 11" (1.499 m)   Wt 74.9 kg (165 lb 2 oz)   BMI 33.35 kg/m²      Physical Exam   Constitutional: She is oriented to " person, place, and time. She appears well-developed and well-nourished. No distress.   HENT:   Head: Normocephalic.   Eyes: No scleral icterus.   Neck: Normal range of motion. No thyromegaly present.   Cardiovascular: Normal rate and regular rhythm.    No murmur heard.  Pulmonary/Chest: Effort normal and breath sounds normal. No respiratory distress. She has no wheezes. She has no rales. Right breast exhibits no inverted nipple, no mass, no nipple discharge, no skin change and no tenderness. Left breast exhibits no inverted nipple, no mass, no nipple discharge, no skin change and no tenderness. Breasts are symmetrical.   Abdominal: Soft. She exhibits no distension and no mass. There is no tenderness. There is no guarding.   Genitourinary:   Genitourinary Comments: Bimanual exam:  Vulva: no lesions. Normal appearance  Urethra: Normal size and location. No lesions  Bladder: No masses or tenderness.  Vagina: normal mucosa. No lesion  Cervix: absent.   Uterus: absent.  Adnexa: no masses.  Rectovaginal: No posterior cul de sac thickening or nodularity.  Rectal: no masses. Nontender. Normal tone.      Musculoskeletal: Normal range of motion. She exhibits no edema.   Lymphadenopathy:     She has no cervical adenopathy.   Neurological: She is alert and oriented to person, place, and time.   Skin: Skin is warm and dry. No rash noted. She is not diaphoretic. No pallor.   Psychiatric: She has a normal mood and affect. Her behavior is normal.   Nursing note and vitals reviewed.      Assessment:       1. Endometrial carcinoma    2. Well woman exam    3. Screening mammogram, encounter for    4. Family history of breast cancer    5. Encounter for pelvic screening for cancer        Plan:   Endometrial carcinoma    Well woman exam    Screening mammogram, encounter for  -     Mammo Digital Screening Bilat with Tomosynthesis CAD; Future; Expected date: 06/28/2018    Family history of breast cancer    Encounter for pelvic screening for  cancer    TASIA on today's exam  MMG at [facility]  F/u with PCP to schedule colonoscopy  Family history reviewed and Informed DNA initiated; she will review with her sister to see if she has had any genetic testing  RTC in 3 months for routine surveillance or sooner for problems

## 2018-07-03 ENCOUNTER — HOSPITAL ENCOUNTER (OUTPATIENT)
Dept: RADIOLOGY | Facility: HOSPITAL | Age: 63
Discharge: HOME | End: 2018-07-03
Attending: NURSE PRACTITIONER
Payer: COMMERCIAL

## 2018-07-03 VITALS — WEIGHT: 165 LBS | HEIGHT: 59 IN | BODY MASS INDEX: 33.26 KG/M2

## 2018-07-03 DIAGNOSIS — Z12.31 SCREENING MAMMOGRAM, ENCOUNTER FOR: ICD-10-CM

## 2018-07-03 PROCEDURE — 77067 SCR MAMMO BI INCL CAD: CPT | Mod: 26,,, | Performed by: RADIOLOGY

## 2018-07-03 PROCEDURE — 77063 BREAST TOMOSYNTHESIS BI: CPT | Mod: 26,,, | Performed by: RADIOLOGY

## 2018-07-03 PROCEDURE — 77067 SCR MAMMO BI INCL CAD: CPT | Mod: TC

## 2018-07-24 ENCOUNTER — OFFICE VISIT (OUTPATIENT)
Dept: SURGERY | Facility: CLINIC | Age: 63
End: 2018-07-24
Payer: COMMERCIAL

## 2018-07-24 VITALS
HEART RATE: 69 BPM | DIASTOLIC BLOOD PRESSURE: 86 MMHG | SYSTOLIC BLOOD PRESSURE: 126 MMHG | BODY MASS INDEX: 32.8 KG/M2 | WEIGHT: 162.69 LBS | TEMPERATURE: 98 F | HEIGHT: 59 IN

## 2018-07-24 DIAGNOSIS — Z12.39 BREAST CANCER SCREENING, HIGH RISK PATIENT: Primary | ICD-10-CM

## 2018-07-24 PROCEDURE — 99203 OFFICE O/P NEW LOW 30 MIN: CPT | Mod: S$GLB,,, | Performed by: SURGERY

## 2018-07-24 PROCEDURE — 99999 PR PBB SHADOW E&M-EST. PATIENT-LVL III: CPT | Mod: PBBFAC,,, | Performed by: SURGERY

## 2018-07-24 PROCEDURE — 3008F BODY MASS INDEX DOCD: CPT | Mod: CPTII,S$GLB,, | Performed by: SURGERY

## 2018-07-24 NOTE — Clinical Note
Dr. Beatty,   Upon recalculation of her TC score, she clarified that her first live birth was at age 18 rather than her previously reported 31 and that her mother had colon cancer rather than ovarian cancer.  This corrected information brought her risk down to 13% so she can resume annual mammograms and clinical exams, but will not require a high risk screening protocol.  Thanks for sending her. Indigo Meyers

## 2018-07-24 NOTE — Clinical Note
August 4, 2018      Edinson Hunter MD  [address] [address]           [facility]  [address]  [facility] [address]  Phone: [phone]  Fax: [phone]          Patient: Brittany Oates   MR Number: [id number]   YOB: 1955   Date of Visit: 7/24/2018       Dear Dr. Edinson Hunter:    Thank you for referring Brittany Oates to me for evaluation. Attached you will find relevant portions of my assessment and plan of care.    If you have questions, please do not hesitate to call me. I look forward to following Brittany Oates along with you.    Sincerely,    Indigo Meyers MD    Enclosure  CC:  No Recipients    If you would like to receive this communication electronically, please contact [email or web address] or [phone] to request more information on [facility] [employer] access.    For providers and/or their staff who would like to refer a patient to [facility], please contact us through our one-stop-shop provider referral line, [facility], at [phone].    If you feel you have received this communication in error or would no longer like to receive these types of communications, please e-mail [email or web address]

## 2018-07-24 NOTE — PROGRESS NOTES
BREAST HIGH RISK CLINIC  [facility]  Breast Surgery      Date of Visit:  2018    Referring provider:  Edinson Hunter MD  [address] [place] [address]    PCP:  Primary Doctor No    HIGH RISK    Brittany Oates is a 63 y.o. postmenopausal female referred for evaluation of increased risk of breast cancer based on TC score with mammogram, reported to be 20.86%.  Here today to discuss options of high risk screening and risk reduction.    Of note, pt has a personal history of uterine cancer and had a recent MILLIE.    Other breast cancer risk factors include family history as noted below.    Age of menarche was 11.  Age of menopause was sometime in her 50's.  Last menstrual period was sometime in her 50's.  Patient denies hormonal therapy.     Patient is .  Age of first live birth was 18.  Patient did not breast feed.       Family History is significant for the following:  Mother:  Breast CA (?diagnosed in her 40's), Colon CA (diagnosed in her 60's),  at 65 from colon CA   Father:  Unknown CA  Sister(s):  Breast CA (at age 58, currently alive (age 64)      Social History:   Smoking:  none  Drinking:  Social drinking  Drug Use: None    The following portions of the patient's history were reviewed and updated as appropriate: allergies, current medications, past family history, past medical history, past social history, past surgical history and problem list.    Review of Systems  Constitutional: negative for chills, fevers, night sweats and weight loss  Eyes: negative for visual changes  Ears, nose, mouth, throat, and face: negative for earaches, hearing loss and voice change  Respiratory: negative for cough, dyspnea on exertion, hemoptysis and stridor  Cardiovascular: negative for chest pain, dyspnea and palpitations  Gastrointestinal: negative for abdominal pain, bright red blood per rectum, change in bowel habits, constipation and diarrhea  Genitourinary:negative for hot  "flashes  Integument/breast: negative for breast lump, breast tenderness and nipple discharge  Hematologic/lymphatic: negative for bleeding and easy bruising  Musculoskeletal:negative for back pain and muscle weakness  Behavioral/Psych: negative for depression      Objective:   /86 (BP Location: Right arm, Patient Position: Sitting, BP Method: Large (Automatic))   Pulse 69   Temp 98 °F (36.7 °C)   Ht 4' 11" (1.499 m)   Wt 73.8 kg (162 lb 11.2 oz)   BMI 32.86 kg/m²     General: NAD  Chest: nonlabored breathing, no obvious deformity  Heart: regular rate  Breast: Left breast - No distinct masses appreciated, no nipple inversion or discharge, no lymphadenopathy in axilla, subclavicular areas, or groin.  No scars or skin changes noted.  Right breast - no masses, no nipple inversion or discharge, no LAD.  Abdomen: soft, nondistended  Extremities: no edema noted      Imaging  Mammograms:  7/3- No mammographic evidence of malignancy  Findings:  Computer-aided detection was utilized in the interpretation of this examination. This procedure was performed using tomosynthesis.       The breasts are heterogeneously dense, which may obscure small masses. There is no evidence of suspicious masses, microcalcifications or architectural distortion.     Impression:  No mammographic evidence of malignancy.     BI-RADS Category 1: Negative     Recommendation:  Routine screening mammogram in 1 year is recommended.     The patient's estimated lifetime risk of breast cancer (to age 85) based on Tyrer-Cuzick - 7 risk assessment model is: Tyrer-Cuzick: 20.86 %. According to the American Cancer Society,  patients with a lifetime breast cancer risk of 20% or higher might benefit from supplemental screening tests.     Assessment:     This is a 63 y.o. female who had a TC score of 13% based on the in-office calculation.    Plan:   Discussed risk models can vary based on the model used.  There are several models available and we " currently use TC model for our patients with family history.  Based on initial screen at the time of mammography, the patient's risk exceeded 20%, but on recalculation with history clarification from the patient, she is actually similar in risk to the general population with a risk of 13%.  She previously reported her age at first live birth to be at 31 rather than 18 and that her mother had ovarian cancer, but today clarified that she in fact had colon cancer instead.    For this average risk, I recommend annual screening mammography and clinical breast exam, but she does not need to be followed under a high risk screening protocol.    Patients with lifetime risk over 20% qualify for increased screening with MRI, in addition to mammograms.  We also would perform breast exams every 6 months.  Discussed pros and cons of MRI screening.    We also discussed risk reduction options. Discussed that we recommend a healthy lifestyle.      Nutrition we recommend fresh fruits and vegetables and lean meats and avoidance of processed foods.    Exercise I recommend at least 30 minutes of cardiovascular exercise 4-5 times per week, even walking would have benefit.  Discussed that exercise can lower the relative risk of breast cancer by about 18-20%.  Also discussed that obesity is linked to higher risk of breast cancer, therefore exercise in important.    Discussed alcohol use and some studies suggest that increase alcohol intake may increase breast cancer risk.  Therefore, I do recommend limited alcohol intake.    Also discussed risk factors that are not modifiable, such as age at menarche, age at menopause, age at first pregnancy, and family history.     I will see the patient back PRN.    30 minutes were spent on this encounter with 20 of which were face to face and 10 of chart review and coordination of care.

## 2018-09-26 ENCOUNTER — TELEPHONE (OUTPATIENT)
Dept: GYNECOLOGIC ONCOLOGY | Facility: CLINIC | Age: 63
End: 2018-09-26

## 2018-09-27 ENCOUNTER — OFFICE VISIT (OUTPATIENT)
Dept: GYNECOLOGIC ONCOLOGY | Facility: CLINIC | Age: 63
End: 2018-09-27
Payer: COMMERCIAL

## 2018-09-27 VITALS
DIASTOLIC BLOOD PRESSURE: 102 MMHG | BODY MASS INDEX: 32.97 KG/M2 | SYSTOLIC BLOOD PRESSURE: 179 MMHG | WEIGHT: 163.56 LBS | HEIGHT: 59 IN | HEART RATE: 81 BPM

## 2018-09-27 DIAGNOSIS — C54.1 ENDOMETRIAL CARCINOMA: Primary | ICD-10-CM

## 2018-09-27 PROCEDURE — 99999 PR PBB SHADOW E&M-EST. PATIENT-LVL III: CPT | Mod: PBBFAC,,, | Performed by: OBSTETRICS & GYNECOLOGY

## 2018-09-27 PROCEDURE — 99214 OFFICE O/P EST MOD 30 MIN: CPT | Mod: S$GLB,,, | Performed by: OBSTETRICS & GYNECOLOGY

## 2018-09-27 PROCEDURE — 1101F PT FALLS ASSESS-DOCD LE1/YR: CPT | Mod: CPTII,S$GLB,, | Performed by: OBSTETRICS & GYNECOLOGY

## 2018-09-27 PROCEDURE — 3008F BODY MASS INDEX DOCD: CPT | Mod: CPTII,S$GLB,, | Performed by: OBSTETRICS & GYNECOLOGY

## 2019-01-21 NOTE — PROGRESS NOTES
"Subjective:       Patient ID: Brittany Oates is a 63 y.o. female.    Chief Complaint: No chief complaint on file.    HPI     Patient comes in today for follow up for endometrial cancer. She has no GYN complaints today.  Denies vaginal bleeding. Completed vaginal cuff radiation 2/2018.     MMG: July 2018: negative. CBE: June 2018  Colonoscopy: never, has PCP in [facility] and will f/u with him     Treatment history:   Nov. 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 Negative pelvic and PA nodes. Negative cytology. 2 mm of invasion put of 28 mm of myometrium. Tumor size of 6 cm.  Completed VBT 2100 cGy in 3 fractions     Review of Systems   Constitutional: Negative for appetite change, chills, diaphoresis, fatigue, fever and unexpected weight change.   Respiratory: Negative for cough, chest tightness, shortness of breath and wheezing.    Cardiovascular: Negative for chest pain, palpitations and leg swelling.   Gastrointestinal: Negative for abdominal distention, abdominal pain, blood in stool, constipation, diarrhea, nausea and vomiting.   Genitourinary: Negative for difficulty urinating, dysuria, flank pain, frequency, hematuria, pelvic pain, vaginal bleeding, vaginal discharge and vaginal pain.   Musculoskeletal: Negative for arthralgias and back pain.   Skin: Negative for color change and rash.   Neurological: Negative for dizziness, weakness, numbness and headaches.   Hematological: Negative for adenopathy.   Psychiatric/Behavioral: Negative for confusion and sleep disturbance. The patient is not nervous/anxious.        Objective:    BP (!) 187/106   Pulse 80   Ht 4' 11" (1.499 m)   Wt 73.7 kg (162 lb 7.7 oz)   BMI 32.82 kg/m²        Physical Exam   Constitutional: She is oriented to person, place, and time. She appears well-developed and well-nourished. No distress.   HENT:   Head: Normocephalic and atraumatic.   Neck: Normal range of motion. Neck supple. "   Cardiovascular: Normal rate and regular rhythm.   No murmur heard.  Pulmonary/Chest: Effort normal and breath sounds normal. No respiratory distress. She has no wheezes.   Abdominal: Soft. She exhibits no distension and no mass. There is no tenderness. There is no guarding.   Genitourinary:   Genitourinary Comments: Bimanual exam:  Vulva: no lesions. Normal appearance  Urethra: Normal size and location. No lesions  Bladder: No masses or tenderness.  Vagina: normal mucosa. No lesion  Cervix: absent.   Uterus: absent.  Adnexa: no masses.  Rectovaginal: No posterior cul de sac thickening or nodularity.  Rectal: no masses. Nontender. Normal tone.      Musculoskeletal: Normal range of motion. She exhibits no edema.   Lymphadenopathy:     She has no cervical adenopathy.   Neurological: She is alert and oriented to person, place, and time.   Skin: Skin is warm and dry. No rash noted. She is not diaphoretic. No pallor.   Psychiatric: She has a normal mood and affect. Her behavior is normal.   Nursing note and vitals reviewed.      Assessment:       1. Endometrial carcinoma        Plan:   Endometrial carcinoma    TASIA on today's exam  RTC in 4 months for routine surveillance

## 2019-01-22 ENCOUNTER — TELEPHONE (OUTPATIENT)
Dept: GYNECOLOGIC ONCOLOGY | Facility: CLINIC | Age: 64
End: 2019-01-22

## 2019-01-23 ENCOUNTER — OFFICE VISIT (OUTPATIENT)
Dept: GYNECOLOGIC ONCOLOGY | Facility: CLINIC | Age: 64
End: 2019-01-23
Payer: MEDICARE

## 2019-01-23 VITALS
DIASTOLIC BLOOD PRESSURE: 106 MMHG | HEART RATE: 80 BPM | HEIGHT: 59 IN | BODY MASS INDEX: 32.76 KG/M2 | WEIGHT: 162.5 LBS | SYSTOLIC BLOOD PRESSURE: 187 MMHG

## 2019-01-23 DIAGNOSIS — C54.1 ENDOMETRIAL CARCINOMA: Primary | ICD-10-CM

## 2019-01-23 PROCEDURE — 99999 PR PBB SHADOW E&M-EST. PATIENT-LVL III: ICD-10-PCS | Mod: PBBFAC,,, | Performed by: OBSTETRICS & GYNECOLOGY

## 2019-01-23 PROCEDURE — 99214 OFFICE O/P EST MOD 30 MIN: CPT | Mod: S$PBB,,, | Performed by: OBSTETRICS & GYNECOLOGY

## 2019-01-23 PROCEDURE — 99999 PR PBB SHADOW E&M-EST. PATIENT-LVL III: CPT | Mod: PBBFAC,,, | Performed by: OBSTETRICS & GYNECOLOGY

## 2019-01-23 PROCEDURE — 99213 OFFICE O/P EST LOW 20 MIN: CPT | Mod: PBBFAC | Performed by: OBSTETRICS & GYNECOLOGY

## 2019-01-23 PROCEDURE — 99214 PR OFFICE/OUTPT VISIT, EST, LEVL IV, 30-39 MIN: ICD-10-PCS | Mod: S$PBB,,, | Performed by: OBSTETRICS & GYNECOLOGY

## 2019-01-23 RX ORDER — TIZANIDINE 4 MG/1
TABLET ORAL
Refills: 0 | COMMUNITY
Start: 2019-01-14 | End: 2019-01-23

## 2019-01-23 RX ORDER — AZITHROMYCIN 500 MG/1
TABLET, FILM COATED ORAL
COMMUNITY
End: 2019-01-23 | Stop reason: ALTCHOICE

## 2019-01-23 RX ORDER — AZITHROMYCIN 500 MG/1
TABLET, FILM COATED ORAL
Refills: 0 | COMMUNITY
Start: 2019-01-14 | End: 2019-01-23 | Stop reason: ALTCHOICE

## 2019-01-23 RX ORDER — CODEINE PHOSPHATE AND GUAIFENESIN 10; 100 MG/5ML; MG/5ML
7.5 SOLUTION ORAL
COMMUNITY
End: 2019-01-23

## 2019-02-06 ENCOUNTER — TELEPHONE (OUTPATIENT)
Dept: GYNECOLOGIC ONCOLOGY | Facility: CLINIC | Age: 64
End: 2019-02-06

## 2019-02-08 ENCOUNTER — TELEPHONE (OUTPATIENT)
Dept: GYNECOLOGIC ONCOLOGY | Facility: CLINIC | Age: 64
End: 2019-02-08

## 2019-02-08 NOTE — TELEPHONE ENCOUNTER
Spoke with patient. Does not need disability papers. Wrong papers were sent to me.    She will call [facility] and have the right papers sent. They only need to know that she is having follow up for her endometrial cancer.     ----- Message from Izabella Jenkins MA sent at 2/8/2019  2:10 PM CST -----  Contact: pt      ----- Message -----  From: Jessica Veloz  Sent: 2/8/2019   1:44 PM  To: [facility]KEON Staff    Name of Who is Calling: DANIELLE GROSS [[id number]]      What is the request in detail: pt returning call.. Please advise      Can the clinic reply by [facility]: no    What Number to Call Back if not in [facility]: [phone]

## 2019-06-03 ENCOUNTER — TELEPHONE (OUTPATIENT)
Dept: GYNECOLOGIC ONCOLOGY | Facility: CLINIC | Age: 64
End: 2019-06-03

## 2019-06-07 ENCOUNTER — TELEPHONE (OUTPATIENT)
Dept: GYNECOLOGIC ONCOLOGY | Facility: CLINIC | Age: 64
End: 2019-06-07

## 2019-06-10 ENCOUNTER — OFFICE VISIT (OUTPATIENT)
Dept: GYNECOLOGIC ONCOLOGY | Facility: CLINIC | Age: 64
End: 2019-06-10
Payer: MEDICARE

## 2019-06-10 VITALS
SYSTOLIC BLOOD PRESSURE: 142 MMHG | BODY MASS INDEX: 33.15 KG/M2 | HEART RATE: 70 BPM | DIASTOLIC BLOOD PRESSURE: 74 MMHG | WEIGHT: 164.44 LBS | HEIGHT: 59 IN

## 2019-06-10 DIAGNOSIS — C54.1 ENDOMETRIAL CARCINOMA: Primary | ICD-10-CM

## 2019-06-10 DIAGNOSIS — Z01.419 WELL WOMAN EXAM: ICD-10-CM

## 2019-06-10 DIAGNOSIS — Z12.31 SCREENING MAMMOGRAM, ENCOUNTER FOR: ICD-10-CM

## 2019-06-10 PROCEDURE — G0101 CA SCREEN;PELVIC/BREAST EXAM: HCPCS | Mod: PBBFAC | Performed by: OBSTETRICS & GYNECOLOGY

## 2019-06-10 PROCEDURE — G0101 CA SCREEN;PELVIC/BREAST EXAM: HCPCS | Mod: S$PBB,,, | Performed by: OBSTETRICS & GYNECOLOGY

## 2019-06-10 PROCEDURE — 99999 PR PBB SHADOW E&M-EST. PATIENT-LVL III: ICD-10-PCS | Mod: PBBFAC,,, | Performed by: OBSTETRICS & GYNECOLOGY

## 2019-06-10 PROCEDURE — 99999 PR PBB SHADOW E&M-EST. PATIENT-LVL III: CPT | Mod: PBBFAC,,, | Performed by: OBSTETRICS & GYNECOLOGY

## 2019-06-10 PROCEDURE — 99213 OFFICE O/P EST LOW 20 MIN: CPT | Mod: PBBFAC,25 | Performed by: OBSTETRICS & GYNECOLOGY

## 2019-06-10 PROCEDURE — G0101 PR CA SCREEN;PELVIC/BREAST EXAM: ICD-10-PCS | Mod: S$PBB,,, | Performed by: OBSTETRICS & GYNECOLOGY

## 2019-06-10 RX ORDER — DOXYCYCLINE 100 MG/1
CAPSULE ORAL
COMMUNITY
End: 2019-06-10

## 2019-06-10 RX ORDER — OFLOXACIN 3 MG/ML
SOLUTION/ DROPS OPHTHALMIC
Refills: 0 | COMMUNITY
Start: 2019-04-17 | End: 2019-06-10

## 2019-06-10 RX ORDER — PROMETHAZINE HYDROCHLORIDE AND DEXTROMETHORPHAN HYDROBROMIDE 6.25; 15 MG/5ML; MG/5ML
5 SYRUP ORAL
COMMUNITY
End: 2019-06-10

## 2019-06-10 RX ORDER — DUREZOL 0.5 MG/ML
EMULSION OPHTHALMIC
Refills: 2 | COMMUNITY
Start: 2019-04-17 | End: 2019-06-10

## 2019-06-10 RX ORDER — KETOROLAC TROMETHAMINE 5 MG/ML
SOLUTION OPHTHALMIC
Refills: 2 | COMMUNITY
Start: 2019-04-17 | End: 2019-06-10

## 2019-06-10 RX ORDER — AZITHROMYCIN 500 MG/1
TABLET, FILM COATED ORAL
COMMUNITY
End: 2019-06-10

## 2019-06-10 NOTE — PROGRESS NOTES
"Subjective:       Patient ID: Brittany Oates is a 64 y.o. female.    Chief Complaint: Endometrial carcinoma and Follow-up (4 mth )    HPI   Patient comes in today for her WWE and follow pup for endometrial cancer.  Denies vaginal bleeding.       MMG: July 2018: negative. CBE: June 2018  Colonoscopy: never, has PCP in [facility] and will f/u with him     Treatment history:   Nov. 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 Negative pelvic and PA nodes. Negative cytology. 2 mm of invasion put of 28 mm of myometrium. Tumor size of 6 cm.  Completed VBT 2100 cGy in 3 fractions    Review of Systems   Constitutional: Negative for chills, fatigue and fever.   Eyes: Negative for visual disturbance.   Respiratory: Negative for cough, shortness of breath and wheezing.    Cardiovascular: Negative for chest pain, palpitations and leg swelling.   Gastrointestinal: Negative for abdominal distention, abdominal pain, constipation, diarrhea, nausea and vomiting.   Genitourinary: Negative for difficulty urinating, dysuria, frequency, genital sores, hematuria, pelvic pain, urgency, vaginal bleeding, vaginal discharge and vaginal pain.   Musculoskeletal: Negative for gait problem and neck stiffness.   Skin: Negative for rash.   Neurological: Negative for seizures and weakness.   Hematological: Negative for adenopathy. Does not bruise/bleed easily.   Psychiatric/Behavioral: The patient is not nervous/anxious.        Objective:   BP (!) 142/74   Pulse 70   Ht 4' 11" (1.499 m)   Wt 74.6 kg (164 lb 7.4 oz)   BMI 33.22 kg/m²      Physical Exam   Constitutional: She is oriented to person, place, and time. She appears well-developed and well-nourished.   HENT:   Head: Normocephalic and atraumatic.   Eyes: No scleral icterus.   Neck: No tracheal deviation present. No thyroid mass and no thyromegaly present.   Cardiovascular: Normal rate and regular rhythm.   Pulmonary/Chest: Effort normal and " breath sounds normal. She has no wheezes. Right breast exhibits no mass, no nipple discharge, no skin change and no tenderness. Left breast exhibits no mass, no nipple discharge, no skin change and no tenderness.   Abdominal: She exhibits no distension and no mass. There is no hepatosplenomegaly. There is no tenderness. There is no rebound and no guarding.   Genitourinary:   Genitourinary Comments: Bimanual exam:  Vulva: no lesions. Normal appearance  Urethra: Normal size and location. No lesions  Bladder: No masses or tenderness.  Vagina: normal mucosa. No lesion  Cervix: absent.   Uterus: absent.  Adnexa: no masses.  Rectovaginal: No posterior cul de sac thickening or nodularity.  Rectal: no masses. Nontender. Normal tone.      Musculoskeletal: She exhibits no edema or tenderness.   Lymphadenopathy:     She has no cervical adenopathy.     She has no axillary adenopathy.        Right: No inguinal and no supraclavicular adenopathy present.        Left: No inguinal and no supraclavicular adenopathy present.   Neurological: She is alert and oriented to person, place, and time.   Skin: Skin is warm and dry. No rash noted.   Psychiatric: She has a normal mood and affect. Her behavior is normal. Judgment and thought content normal.       Assessment:       1. Endometrial carcinoma    2. Well woman exam    3. Screening mammogram, encounter for        Plan:   Endometrial carcinoma   TASIA   RTC in 4 months      Well woman exam  Counseling time of 10 minutes discussing calcium, vitamin D and exercise. Questions answered.     Screening mammogram, encounter for  -     Mammo Digital Screening Bilat; Future; Expected date: 07/05/2019

## 2019-07-05 ENCOUNTER — HOSPITAL ENCOUNTER (OUTPATIENT)
Dept: RADIOLOGY | Facility: HOSPITAL | Age: 64
Discharge: HOME | End: 2019-07-05
Attending: OBSTETRICS & GYNECOLOGY
Payer: MEDICARE

## 2019-07-05 DIAGNOSIS — Z12.31 SCREENING MAMMOGRAM, ENCOUNTER FOR: ICD-10-CM

## 2019-07-05 PROCEDURE — 77063 MAMMO DIGITAL SCREENING BILAT WITH TOMOSYNTHESIS_CAD: ICD-10-PCS | Mod: 26,,, | Performed by: RADIOLOGY

## 2019-07-05 PROCEDURE — 77067 MAMMO DIGITAL SCREENING BILAT WITH TOMOSYNTHESIS_CAD: ICD-10-PCS | Mod: 26,,, | Performed by: RADIOLOGY

## 2019-07-05 PROCEDURE — 77063 BREAST TOMOSYNTHESIS BI: CPT | Mod: 26,,, | Performed by: RADIOLOGY

## 2019-07-05 PROCEDURE — 77067 SCR MAMMO BI INCL CAD: CPT | Mod: 26,,, | Performed by: RADIOLOGY

## 2019-07-05 PROCEDURE — 77067 SCR MAMMO BI INCL CAD: CPT | Mod: TC

## 2019-07-08 DIAGNOSIS — R92.8 ABNORMALITY OF LEFT BREAST ON SCREENING MAMMOGRAM: ICD-10-CM

## 2019-07-12 ENCOUNTER — TELEPHONE (OUTPATIENT)
Dept: GYNECOLOGIC ONCOLOGY | Facility: CLINIC | Age: 64
End: 2019-07-12

## 2019-07-12 NOTE — TELEPHONE ENCOUNTER
----- Message from Izabella Jenkins MA sent at 7/11/2019  3:46 PM CDT -----      ----- Message -----  From: Edinson Hunter MD  Sent: 7/10/2019  12:14 PM  To: [facility] Staff    Left message about patient's abnormal mammogram and that she needs a diagnostic left mammogram and ultrasound.  I asked the patient to call back to schedule.  Orders have been placed.

## 2019-07-26 ENCOUNTER — HOSPITAL ENCOUNTER (OUTPATIENT)
Dept: RADIOLOGY | Facility: HOSPITAL | Age: 64
Discharge: HOME | End: 2019-07-26
Attending: OBSTETRICS & GYNECOLOGY
Payer: MEDICARE

## 2019-07-26 DIAGNOSIS — R92.8 ABNORMALITY OF LEFT BREAST ON SCREENING MAMMOGRAM: ICD-10-CM

## 2019-07-26 PROCEDURE — 77065 MAMMO DIGITAL DIAGNOSTIC LEFT WITH TOMOSYNTHESIS_CAD: ICD-10-PCS | Mod: 26,LT,, | Performed by: RADIOLOGY

## 2019-07-26 PROCEDURE — 77061 BREAST TOMOSYNTHESIS UNI: CPT | Mod: TC,PO,LT

## 2019-07-26 PROCEDURE — 77061 BREAST TOMOSYNTHESIS UNI: CPT | Mod: 26,LT,, | Performed by: RADIOLOGY

## 2019-07-26 PROCEDURE — 77061 MAMMO DIGITAL DIAGNOSTIC LEFT WITH TOMOSYNTHESIS_CAD: ICD-10-PCS | Mod: 26,LT,, | Performed by: RADIOLOGY

## 2019-07-26 PROCEDURE — 77065 DX MAMMO INCL CAD UNI: CPT | Mod: TC,PO,LT

## 2019-07-26 PROCEDURE — 77065 DX MAMMO INCL CAD UNI: CPT | Mod: 26,LT,, | Performed by: RADIOLOGY

## 2019-10-09 ENCOUNTER — TELEPHONE (OUTPATIENT)
Dept: ADMINISTRATIVE | Facility: OTHER | Age: 64
End: 2019-10-09

## 2019-10-10 ENCOUNTER — OFFICE VISIT (OUTPATIENT)
Dept: GYNECOLOGIC ONCOLOGY | Facility: CLINIC | Age: 64
End: 2019-10-10
Payer: MEDICARE

## 2019-10-10 VITALS
BODY MASS INDEX: 33.47 KG/M2 | HEART RATE: 75 BPM | DIASTOLIC BLOOD PRESSURE: 77 MMHG | WEIGHT: 166 LBS | SYSTOLIC BLOOD PRESSURE: 150 MMHG | HEIGHT: 59 IN

## 2019-10-10 DIAGNOSIS — C54.1 ENDOMETRIAL CARCINOMA: Primary | ICD-10-CM

## 2019-10-10 PROCEDURE — 99999 PR PBB SHADOW E&M-EST. PATIENT-LVL III: CPT | Mod: PBBFAC,,, | Performed by: OBSTETRICS & GYNECOLOGY

## 2019-10-10 PROCEDURE — 99214 OFFICE O/P EST MOD 30 MIN: CPT | Mod: S$PBB,,, | Performed by: OBSTETRICS & GYNECOLOGY

## 2019-10-10 PROCEDURE — 99999 PR PBB SHADOW E&M-EST. PATIENT-LVL III: ICD-10-PCS | Mod: PBBFAC,,, | Performed by: OBSTETRICS & GYNECOLOGY

## 2019-10-10 PROCEDURE — 99214 PR OFFICE/OUTPT VISIT, EST, LEVL IV, 30-39 MIN: ICD-10-PCS | Mod: S$PBB,,, | Performed by: OBSTETRICS & GYNECOLOGY

## 2019-10-10 PROCEDURE — 99213 OFFICE O/P EST LOW 20 MIN: CPT | Mod: PBBFAC | Performed by: OBSTETRICS & GYNECOLOGY

## 2019-10-10 NOTE — PROGRESS NOTES
"Subjective:       Patient ID: Brittany Oates is a 64 y.o. female.    Chief Complaint: Endometrial Carcinoma and Follow-up (4 mth )    HPI     Patient comes in today for her follow pup for endometrial cancer.  Denies vaginal bleeding.         MMG: July 26, 2019: negative.   CBE: Kiesha 10, 2019  Colonoscopy: never, has PCP in [facility] and will f/u with him     Treatment history:   Nov. 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 Negative pelvic and PA nodes. Negative cytology. 2 mm of invasion put of 28 mm of myometrium. Tumor size of 6 cm.  Completed VBT 2100 cGy in 3 fractions     Review of Systems   Constitutional: Negative for chills, fatigue and fever.   Respiratory: Negative for cough, shortness of breath and wheezing.    Cardiovascular: Negative for chest pain, palpitations and leg swelling.   Gastrointestinal: Negative for abdominal pain, constipation, diarrhea, nausea and vomiting.   Genitourinary: Negative for difficulty urinating, dysuria, frequency, genital sores, hematuria, urgency, vaginal bleeding, vaginal discharge and vaginal pain.   Neurological: Negative for weakness.   Hematological: Negative for adenopathy. Does not bruise/bleed easily.   Psychiatric/Behavioral: The patient is not nervous/anxious.        Objective:   BP (!) 150/77   Pulse 75   Ht 4' 11" (1.499 m)   Wt 75.3 kg (166 lb 0.1 oz)   BMI 33.53 kg/m²      Physical Exam   Constitutional: She is oriented to person, place, and time. She appears well-developed and well-nourished.   HENT:   Head: Normocephalic and atraumatic.   Eyes: No scleral icterus.   Neck: Neck supple. No tracheal deviation present. No thyroid mass and no thyromegaly present.   Cardiovascular: Normal rate and regular rhythm.   Pulmonary/Chest: Effort normal and breath sounds normal. She has no wheezes.   Abdominal: Soft. She exhibits no distension and no mass. There is no hepatosplenomegaly. There is no tenderness. There is " no rebound and no guarding.   Genitourinary:   Genitourinary Comments: Bimanual exam:  Vulva: no lesions. Normal appearance  Urethra: Normal size and location. No lesions  Bladder: No masses or tenderness.  Vagina: normal mucosa. No lesion  Cervix: absent.   Uterus: absent.  Adnexa: no masses.  Rectovaginal: Not performed     Musculoskeletal: She exhibits no edema or tenderness.   Lymphadenopathy:     She has no cervical adenopathy.     She has no axillary adenopathy.        Right: No inguinal and no supraclavicular adenopathy present.        Left: No inguinal and no supraclavicular adenopathy present.   Neurological: She is alert and oriented to person, place, and time.   Skin: Skin is warm and dry.   Psychiatric: She has a normal mood and affect. Her behavior is normal. Judgment and thought content normal.       Assessment:       1. Endometrial carcinoma        Plan:   Endometrial carcinoma     TASIA   RTC 6 months   She is to see her PCP to schedule a colonoscopy.

## 2020-03-30 ENCOUNTER — OFFICE VISIT (OUTPATIENT)
Dept: URGENT CARE | Facility: CLINIC | Age: 65
End: 2020-03-30
Payer: MEDICARE

## 2020-03-30 VITALS
RESPIRATION RATE: 16 BRPM | HEART RATE: 80 BPM | HEIGHT: 59 IN | SYSTOLIC BLOOD PRESSURE: 210 MMHG | WEIGHT: 138 LBS | DIASTOLIC BLOOD PRESSURE: 116 MMHG | OXYGEN SATURATION: 97 % | BODY MASS INDEX: 27.82 KG/M2 | TEMPERATURE: 99 F

## 2020-03-30 DIAGNOSIS — I10 ESSENTIAL HYPERTENSION: Primary | ICD-10-CM

## 2020-03-30 DIAGNOSIS — T88.7XXA SIDE EFFECT OF MEDICATION: ICD-10-CM

## 2020-03-30 DIAGNOSIS — J06.9 VIRAL UPPER RESPIRATORY INFECTION: ICD-10-CM

## 2020-03-30 DIAGNOSIS — R05.9 COUGH: ICD-10-CM

## 2020-03-30 DIAGNOSIS — I10 UNCONTROLLED HYPERTENSION: ICD-10-CM

## 2020-03-30 PROCEDURE — 99204 OFFICE O/P NEW MOD 45 MIN: CPT | Mod: S$GLB,,, | Performed by: INTERNAL MEDICINE

## 2020-03-30 PROCEDURE — 99204 PR OFFICE/OUTPT VISIT, NEW, LEVL IV, 45-59 MIN: ICD-10-PCS | Mod: S$GLB,,, | Performed by: INTERNAL MEDICINE

## 2020-03-30 RX ORDER — BENZONATATE 200 MG/1
200 CAPSULE ORAL 3 TIMES DAILY PRN
Qty: 30 CAPSULE | Refills: 0 | Status: SHIPPED | OUTPATIENT
Start: 2020-03-30 | End: 2020-04-09

## 2020-03-30 RX ORDER — PROMETHAZINE HYDROCHLORIDE AND DEXTROMETHORPHAN HYDROBROMIDE 6.25; 15 MG/5ML; MG/5ML
5 SYRUP ORAL 4 TIMES DAILY PRN
Qty: 140 ML | Refills: 0 | Status: SHIPPED | OUTPATIENT
Start: 2020-03-30 | End: 2020-04-06

## 2020-03-30 NOTE — PATIENT INSTRUCTIONS
Taking Your Blood Pressure  Blood pressure is the force of blood against the artery wall as it moves from the heart through the blood vessels. You can take your own blood pressure reading using a digital monitor. Take your readings the same each time, using the same arm. Take readings as often as your healthcare provider instructs.  About blood pressure monitors  Blood pressure monitors are designed for certain ages and cases. You can find monitors for older adults, for pregnant women, and for children. Make sure the one you choose is the right one for your age and situation.  The American Heart Association recommends an automatic cuff monitor that fits on your upper arm (bicep). The cuff should fit your arm size. A cuff thats too large or too small will not give an accurate reading. Measure around your upper arm to find your size.  Monitors that attach to your finger or wrist are not as accurate as monitors for your upper arm.  Ask your healthcare provider for help in choosing a monitor. Bring your monitor to your next provider visit if you need help in using it the correct way.  The steps below are general instructions for using an automatic digital monitor.  Step 1. Relax    · Take your blood pressure at the same time every day, such as in the morning or evening, or at the time your healthcare provider recommends.  · Wait at least a half-hour after smoking, eating, or exercising. Don't drink coffee, tea, soda, or other caffeinated beverages before checking your blood pressure.  · Sit comfortably at a table with both feet on the floor. Do not cross your legs or feet. Place the monitor near you.  · Rest for a few minutes before you begin.  Step 2. Wrap the cuff    · Place your arm on the table, palm up. Your arm should be at the level of your heart. Wrap the cuff around your upper arm, just above your elbow. Its best done on bare skin, not over clothing. Most cuffs will indicate where the brachial artery (the  blood vessel in the middle of the arm at the inner side of the elbow) should line up with the cuff. Look in your monitor's instruction booklet for an illustration. You can also bring your cuff to your healthcare provider and have them show you how to correctly place the cuff.  Step 3. Inflate the cuff    · Push the button that starts the pump.  · The cuff will tighten, then loosen.  · The numbers will change. When they stop changing, your blood pressure reading will appear.  · Take 2 or 3 readings one minute apart.  Step 4. Write down the results of each reading    · Write down your blood pressure numbers for each reading. Note the date and time. Keep your results in one place, such as a notebook. Even if your monitor has a built-in memory, keep a hard copy of the readings.  · Remove the cuff from your arm. Turn off the machine.  · Bring your blood pressure records with your healthcare providers at each visit.  · If you start a new blood pressure medicine, note the day you started the new medicine. Also note the day if you change the dose of your medicine. This information goes on your blood pressure recording sheet. This will help your healthcare provider monitor how well the medicine changes are working.  · Ask your healthcare provider what numbers should prompt you to call him or her. Also ask what numbers should prompt you to get help right away.  Date Last Reviewed: 11/1/2016 © 2000-2017 [email or web address]. [address] [place] [id number]. All rights reserved. This information is not intended as a substitute for professional medical care. Always follow your healthcare professional's instructions.        Discharge Instructions for High Blood Pressure (Hypertension)  You have been diagnosed with high blood pressure (also called hypertension). This means the force of blood against your artery walls is too strong. It also means your heart is working hard to move blood. High blood pressure usually  "has no symptoms, but over time, it can damage your heart, blood vessels, eyes, kidneys, and other organs. With help from your doctor, you can manage your blood pressure and protect your health.  Taking medicine  · Learn to take your own blood pressure. Keep a record of your results. Ask your doctor which readings mean that you need medical attention.  · Take your blood pressure medicine exactly as directed. Dont skip doses. Missing doses can cause your blood pressure to get out of control.  · If you do miss a dose (or doses) check with your healthcare provider about what to do.  · Avoid medicine that contain heart stimulants, including over-the-counter drugs. Check for warnings about high blood pressure on the label. Ask the pharmacist before purchasing something you haven't used before  · Check with your doctor or pharmacist before taking a decongestant. Some decongestants can worsen high blood pressure.  Lifestyle changes  · Maintain a healthy weight. Get help to lose any extra pounds.  · Cut back on salt.  ¨ Limit canned, dried, packaged, and fast foods.  ¨ Dont add salt to your food at the table.  ¨ Season foods with herbs instead of salt when you cook.  ¨ Request no added salt when you go to a restaurant.  ¨ The American Heart Associations (AHA) "ideal" sodium intake recommendation is 1,500 milligrams per day.  However, since American's eat so much salt, the AHA says a positive change can occur by cutting back to even 2,400 milligrams of sodium a day.   · Follow the DASH (Dietary Approaches to Stop Hypertension) eating plan. This plan recommends vegetables, fruits, whole gains, and other heart healthy foods.  · Begin an exercise program. Ask your doctor how to get started. The American Heart Association recommends aerobic exercise 3 to 4 times a week for an average of 40 minutes at a time, with your doctor's approval. Simple activities like walking or gardening can help.  · Break the smoking habit. Enroll in " a stop-smoking program to improve your chances of success. Ask your healthcare provider about programs and medicines to help you stop smoking.  · Limit drinks that contain caffeine (coffee, black or green tea, cola) to 2 per day.  · Never take stimulants such as amphetamines or cocaine; these drugs can be deadly for someone with high blood pressure.  · Control your stress. Learn stress-management techniques.  · Limit alcohol to no more than 1 drink a day for women and 2 drinks a day for men.  Follow-up care  Make a follow-up appointment as directed by our staff.     When to seek medical care  Call your doctor immediately or seek emergency care if you have any of the following:  · Chest pain or shortness of breath (call 911)  · Moderate to severe headache  · Weakness in the muscles of your face, arms, or legs  · Trouble speaking  · Extreme drowsiness  · Confusion  · Fainting or dizziness  · Pulsating or rushing sound in your ears  · Unexplained nosebleed  · Weakness, tingling, or numbness of your face, arms, or legs  · Change in vision  · Blood pressure measured at home that is greater than 180/110   Date Last Reviewed: 4/27/2016  © 3826-1018 [employer]. [address] [place] [address]. All rights reserved. This information is not intended as a substitute for professional medical care. Always follow your healthcare professional's instructions.        Viral Upper Respiratory Illness (Adult)  You have a viral upper respiratory illness (URI), which is another term for the common cold. This illness is contagious during the first few days. It is spread through the air by coughing and sneezing. It may also be spread by direct contact (touching the sick person and then touching your own eyes, nose, or mouth). Frequent handwashing will decrease risk of spread. Most viral illnesses go away within 7 to 10 days with rest and simple home remedies. Sometimes the illness may last for several weeks.  Antibiotics will not kill a virus, and they are generally not prescribed for this condition.    Home care  · If symptoms are severe, rest at home for the first 2 to 3 days. When you resume activity, don't let yourself get too tired.  · Avoid being exposed to cigarette smoke (yours or others).  · You may use acetaminophen or ibuprofen to control pain and fever, unless another medicine was prescribed. (Note: If you have chronic liver or kidney disease, have ever had a stomach ulcer or gastrointestinal bleeding, or are taking blood-thinning medicines, talk with your healthcare provider before using these medicines.) Aspirin should never be given to anyone under 18 years of age who is ill with a viral infection or fever. It may cause severe liver or brain damage.  · Your appetite may be poor, so a light diet is fine. Avoid dehydration by drinking 6 to 8 glasses of fluids per day (water, soft drinks, juices, tea, or soup). Extra fluids will help loosen secretions in the nose and lungs.  · Over-the-counter cold medicines will not shorten the length of time youre sick, but they may be helpful for the following symptoms: cough, sore throat, and nasal and sinus congestion. (Note: Do not use decongestants if you have high blood pressure.)  Follow-up care  Follow up with your healthcare provider, or as advised.  When to seek medical advice  Call your healthcare provider right away if any of these occur:  · Cough with lots of colored sputum (mucus)  · Severe headache; face, neck, or ear pain  · Difficulty swallowing due to throat pain  · Fever of 100.4°F (38°C)  Call 911, or get immediate medical care  Call emergency services right away if any of these occur:  · Chest pain, shortness of breath, wheezing, or difficulty breathing  · Coughing up blood  · Inability to swallow due to throat pain  Date Last Reviewed: 9/13/2015  © 6705-2857 [employer]. [address] [place] [address], [place] [address]. All rights reserved.  This information is not intended as a substitute for professional medical care. Always follow your healthcare professional's instructions.      Instructions for Patients Awaiting COVID-19 Test Results    You will either be called with your test result or it will be released to the patient portal.  If you have any questions about your test, please visit [email or web address] or call our COVID-19 information line at [phone].    Prevention steps for patients with confirmed or suspected COVID-19     Stay home except to get medical care.   Separate yourself from other people and animals in your home   Call ahead before visiting your doctor.   Wear a facemask.   Cover your coughs and sneezes.   Clean your hands often.   Avoid sharing personal household items.   Clean all high-touch surfaces every day.   Monitor your symptoms. Seek prompt medical attention if your illness is worsening (e.g., difficulty breathing). Before seeking care, call your healthcare provider.   If you have a medical emergency and need to call 911, notify the dispatch personnel that you have, or are being evaluated for COVID-19. If possible, put on a facemask before emergency medical services arrive.   Discontinuing home isolation. Call your provider about guidance to discontinue home isolation.    Recommended precautions for household members, intimate partners, and caregivers in a nonhealthcare setting of a patient with symptomatic laboratory-confirmed COVID-19 or a patient under investigation.  Household members, intimate partners, and caregivers in a nonhealthcare setting may have close contact with a person with symptomatic, laboratory-confirmed COVID-19 or a person under investigation. Close contacts should monitor their health; they should call their healthcare provider right away if they develop symptoms suggestive of COVID-19 (e.g., fever, cough, shortness of breath).    Close contacts should also follow these  recommendations:   Make sure that you understand and can help the patient follow their healthcare provider's instructions for medication(s) and care. You should help the patient with basic needs in the home and provide support for getting groceries, prescriptions, and other personal needs.   Monitor the patient's symptoms. If the patient is getting sicker, call his or her healthcare provider and tell them that the patient has laboratory-confirmed COVID-19. This will help the healthcare provider's office take steps to keep other people in the office or waiting room from getting infected. Ask the healthcare provider to call the local or [facility] health department for additional guidance. If the patient has a medical emergency and you need to call 911, notify the dispatch personnel that the patient has, or is being evaluated for COVID-19.   Household members should stay in another room or be  from the patient as much as possible. Household members should use a separate bedroom and bathroom, if available.   Prohibit visitors who do not have an essential need to be in the home.   Household members should care for any pets in the home. Do not handle pets or other animals while sick.   Make sure that shared spaces in the home have good air flow, such as by an air conditioner or an opened window, weather permitting.   Perform hand hygiene frequently. Wash your hands often with soap and water for at least 20 seconds or use an alcohol-based hand  that contains 60 to 95% alcohol, covering all surfaces of your hands and rubbing them together until they feel dry. Soap and water should be used preferentially if hands are visibly dirty.   Avoid touching your eyes, nose, and mouth with unwashed hands.   The patient should wear a facemask when you are around other people. If the patient is not able to wear a facemask (for example, because it causes trouble breathing), you, as the caregiver should wear a mask  when you are in the same room as the patient.   Wear a disposable facemask and gloves when you touch or have contact with the patient's blood, stool, or body fluids, such as saliva, sputum, nasal mucus, vomit, urine.  o Throw out disposable facemasks and gloves after using them. Do not reuse.  o When removing personal protective equipment, first remove and dispose of gloves. Then, immediately clean your hands with soap and water or alcohol-based hand . Next, remove and dispose of facemask, and immediately clean your hands again with soap and water or alcohol-based hand .   Avoid sharing household items with the patient. You should not share dishes, drinking glasses, cups, eating utensils, towels, bedding, or other items. After the patient uses these items, you should wash them thoroughly (see below Wash laundry thoroughly).   Clean all high-touch surfaces, such as counters, tabletops, doorknobs, bathroom fixtures, toilets, phones, keyboards, tablets, and bedside tables, every day. Also, clean any surfaces that may have blood, stool, or body fluids on them.   Use a household cleaning spray or wipe, according to the label instructions. Labels contain instructions for safe and effective use of the cleaning product including precautions you should take when applying the product, such as wearing gloves and making sure you have good ventilation during use of the product.   Wash laundry thoroughly.  o Immediately remove and wash clothes or bedding that have blood, stool, or body fluids on them.  o Wear disposable gloves while handling soiled items and keep soiled items away from your body. Clean your hands (with soap and water or an alcohol-based hand ) immediately after removing your gloves.  o Read and follow directions on labels of laundry or clothing items and detergent. In general, using a normal laundry detergent according to washing machine instructions and dry thoroughly using  the warmest temperatures recommended on the clothing label.   Place all used disposable gloves, facemasks, and other contaminated items in a lined container before disposing of them with other household waste. Clean your hands (with soap and water or an alcohol-based hand ) immediately after handling these items. Soap and water should be used preferentially if hands are visibly dirty.   Discuss any additional questions with your state or local health department or healthcare provider. Check available hours when contacting your local health department.    For more information see CDC link below.      https://www.cdc.gov/coronavirus/2019-ncov/hcp/guidance-prevent-spread.html#precautions        Sources:  [facility], [facility] and [facility]

## 2020-03-30 NOTE — LETTER
March 30, 2020      [facility]  [place]  [address]  [facility] [address]  Phone: [phone]  Fax: [phone]       Patient: Brittany Oates   YOB: 1955  Date of Visit: 03/30/2020    To Whom It May Concern:    Jabier Oates  was at [facility] on 03/30/2020. She may return to work/school on 4/6/2020 with no restrictions. If you have any questions or concerns, or if I can be of further assistance, please do not hesitate to contact me.    Sincerely,              Odalis Santos NP

## 2020-03-30 NOTE — PROGRESS NOTES
"Subjective:       Patient ID: Brittany Oates is a 65 y.o. female.    Vitals:  height is 4' 11" (1.499 m) and weight is 62.6 kg (138 lb). Her tympanic temperature is 98.6 °F (37 °C). Her blood pressure is 210/116 (abnormal) and her pulse is 80. Her respiration is 16 and oxygen saturation is 97%.     Chief Complaint: Cough (nonproductive )    Cough   This is a new problem. The current episode started yesterday. The problem has been gradually worsening. The cough is non-productive. Associated symptoms include postnasal drip. Pertinent negatives include no chills, ear pain, eye redness, fever, headaches, hemoptysis, myalgias, rash, sore throat, shortness of breath or wheezing. Nothing aggravates the symptoms. She has tried OTC cough suppressant for the symptoms. The treatment provided no relief.       Constitution: Negative for chills, sweating, fatigue, fever and generalized weakness.        Just "Feeling bad" denies any pain   HENT: Positive for postnasal drip. Negative for ear pain, congestion, sinus pain, sinus pressure, sore throat and voice change.    Neck: Negative for painful lymph nodes.   Eyes: Negative for eye redness, photophobia, vision loss, double vision and blurred vision.   Respiratory: Positive for cough. Negative for chest tightness, sputum production, bloody sputum, COPD, shortness of breath, stridor, wheezing and asthma.    Gastrointestinal: Negative for abdominal pain, nausea, vomiting, constipation and diarrhea.   Musculoskeletal: Negative for muscle ache.   Skin: Negative for rash.   Allergic/Immunologic: Negative for seasonal allergies and asthma.   Neurological: Negative for dizziness, history of vertigo, light-headedness, coordination disturbances, headaches, numbness, tingling, seizures and tremors.   Hematologic/Lymphatic: Negative for swollen lymph nodes.       Objective:      Physical Exam   Constitutional: She is oriented to person, place, and time. She appears well-developed and " well-nourished. She is cooperative.  Non-toxic appearance. She does not have a sickly appearance. She does not appear ill. No distress.   HENT:   Head: Normocephalic and atraumatic.   Right Ear: Hearing, tympanic membrane, external ear and ear canal normal.   Left Ear: Hearing, tympanic membrane, external ear and ear canal normal.   Nose: Nose normal. No mucosal edema, rhinorrhea or nasal deformity. No epistaxis. Right sinus exhibits no maxillary sinus tenderness and no frontal sinus tenderness. Left sinus exhibits no maxillary sinus tenderness and no frontal sinus tenderness.   Mouth/Throat: Uvula is midline, oropharynx is clear and moist and mucous membranes are normal. No trismus in the jaw. Normal dentition. No uvula swelling. No oropharyngeal exudate, posterior oropharyngeal edema or posterior oropharyngeal erythema.   Eyes: Conjunctivae and lids are normal. No scleral icterus.   Neck: Trachea normal, full passive range of motion without pain and phonation normal. Neck supple. No neck rigidity. No edema and no erythema present.   Cardiovascular: Normal rate, regular rhythm, normal heart sounds and normal pulses.   Pulmonary/Chest: Effort normal and breath sounds normal. No respiratory distress. She has no decreased breath sounds. She has no rhonchi.   Abdominal: Soft. Normal appearance and bowel sounds are normal. There is no tenderness.   Musculoskeletal: Normal range of motion. She exhibits no edema or deformity.   Neurological: She is alert and oriented to person, place, and time. She exhibits normal muscle tone. Coordination normal.   Skin: Skin is warm, dry, intact, not diaphoretic and not pale.   Psychiatric: She has a normal mood and affect. Her speech is normal and behavior is normal. Judgment and thought content normal. Cognition and memory are normal.   Nursing note and vitals reviewed.        Assessment:       1. Essential hypertension    2. Viral upper respiratory infection    3. Cough    4.  Uncontrolled hypertension    5. Side effect of medication        Plan:       1. Essential hypertension  Pt did not take bp meds this am.     2. Viral upper respiratory infection    - benzonatate (TESSALON) 200 MG capsule; Take 1 capsule (200 mg total) by mouth 3 (three) times daily as needed for Cough.  Dispense: 30 capsule; Refill: 0  - promethazine-dextromethorphan (PROMETHAZINE-DM) 6.25-15 mg/5 mL Syrp; Take 5 mLs by mouth 4 (four) times daily as needed (cough).  Dispense: 140 mL; Refill: 0    3. Cough    - benzonatate (TESSALON) 200 MG capsule; Take 1 capsule (200 mg total) by mouth 3 (three) times daily as needed for Cough.  Dispense: 30 capsule; Refill: 0  - promethazine-dextromethorphan (PROMETHAZINE-DM) 6.25-15 mg/5 mL Syrp; Take 5 mLs by mouth 4 (four) times daily as needed (cough).  Dispense: 140 mL; Refill: 0    4. Uncontrolled HTN  No neuro s/s at present. Advised imp of bp monitoring as well as not taking otc meds. Must take only bp safe meds. Taking otc meds, unsure what, reports just taking whatever to stop cough. Stressed importance. At risk for stroke .     5. Side effect of medication  See above.

## 2020-12-17 ENCOUNTER — TELEPHONE (OUTPATIENT)
Dept: GYNECOLOGIC ONCOLOGY | Facility: CLINIC | Age: 65
End: 2020-12-17

## 2020-12-17 DIAGNOSIS — Z12.31 VISIT FOR SCREENING MAMMOGRAM: Primary | ICD-10-CM

## 2021-01-21 NOTE — PROGRESS NOTES
"Subjective:       Patient ID: Brittany Oates is a 63 y.o. female.    Chief Complaint: Endometrial carcinoma (3 mth )    HPI   Patient comes in today for follow up for endometrial cancer. She has no GYN complaints today.  Denies vaginal bleeding. Completed vaginal cuff radiation 2/2018.     MMG: July 2018: negative. CBE: June 2018  Colonoscopy: never     Treatment history:   Nov. 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 Negative pelvic and PA nodes. Negative cytology. 2 mm of invasion put of 28 mm of myometrium. Tumor size of 6 cm.  Completed VBT 2100 cGy in 3 fractions     Review of Systems   Constitutional: Negative for chills, fatigue and fever.   Respiratory: Negative for cough, shortness of breath and wheezing.    Cardiovascular: Negative for chest pain, palpitations and leg swelling.   Gastrointestinal: Negative for abdominal pain, constipation, diarrhea, nausea and vomiting.   Genitourinary: Negative for difficulty urinating, dysuria, frequency, genital sores, hematuria, urgency, vaginal bleeding, vaginal discharge and vaginal pain.   Neurological: Negative for weakness.   Hematological: Negative for adenopathy. Does not bruise/bleed easily.   Psychiatric/Behavioral: The patient is not nervous/anxious.        Objective:   BP (!) 179/102   Pulse 81   Ht 4' 11" (1.499 m)   Wt 74.2 kg (163 lb 9.3 oz)   BMI 33.04 kg/m²      Physical Exam   Constitutional: She is oriented to person, place, and time. She appears well-developed and well-nourished.   HENT:   Head: Normocephalic and atraumatic.   Eyes: No scleral icterus.   Neck: Neck supple. No tracheal deviation present. No thyroid mass and no thyromegaly present.   Cardiovascular: Normal rate and regular rhythm.   Pulmonary/Chest: Effort normal and breath sounds normal. She has no wheezes.   Abdominal: Soft. She exhibits no distension and no mass. There is no hepatosplenomegaly. There is no tenderness. There is no " rebound and no guarding.   Genitourinary:   Genitourinary Comments: Bimanual exam:  Vulva: no lesions. Normal appearance  Urethra: Normal size and location. No lesions  Bladder: No masses or tenderness.  Vagina: normal mucosa. No lesion  Cervix: absent.   Uterus: absent.  Adnexa: no masses.  Rectovaginal: Not performed     Musculoskeletal: She exhibits no edema or tenderness.   Lymphadenopathy:     She has no cervical adenopathy.     She has no axillary adenopathy.        Right: No inguinal and no supraclavicular adenopathy present.        Left: No inguinal and no supraclavicular adenopathy present.   Neurological: She is alert and oriented to person, place, and time.   Skin: Skin is warm and dry.   Psychiatric: She has a normal mood and affect. Her behavior is normal. Judgment and thought content normal.       Assessment:       1. Endometrial carcinoma        Plan:   Endometrial carcinoma     TASIA   RTC in 4 months         tympanic

## (undated) DEVICE — LEGGINGS 48X31 INCH

## (undated) DEVICE — TRAY MINOR GEN SURG

## (undated) DEVICE — DRAPE SCOPE PILLOW WARMER

## (undated) DEVICE — KIT PROCEDURE STER INLET CLOSU

## (undated) DEVICE — ELECTRODE REM PLYHSV RETURN 9

## (undated) DEVICE — LUBRICANT SURGILUBE 2 OZ

## (undated) DEVICE — KIT ROBOTIC 3 ARM DA VINCI SI

## (undated) DEVICE — SEE MEDLINE ITEM 154981

## (undated) DEVICE — SEE MEDLINE ITEM 152622

## (undated) DEVICE — PORT AIRSEAL 12/120MM LPI

## (undated) DEVICE — PORT ACCESS 8MM W/120MM LOW

## (undated) DEVICE — SOL ELECTROLUBE ANTI-STIC

## (undated) DEVICE — POWDER ARISTA AH 3G

## (undated) DEVICE — SUT MCRYL PLUS 4-0 PS2 27IN

## (undated) DEVICE — APPLICATOR ARISTA FLEX XL

## (undated) DEVICE — COVER TIP CURVED SCISSORS XI

## (undated) DEVICE — COVER LIGHT HANDLE

## (undated) DEVICE — SUT CTD VICRYL 0 UND BR

## (undated) DEVICE — GOWN SURGICAL X-LARGE

## (undated) DEVICE — MANIPULATOR VCARE PLUS 37MM LG

## (undated) DEVICE — IRRIGATOR ENDOSCOPY DISP.

## (undated) DEVICE — CORD BIPOLAR 12 FOOT

## (undated) DEVICE — Device

## (undated) DEVICE — KIT ANTIFOG

## (undated) DEVICE — SEE MEDLINE ITEM 157148

## (undated) DEVICE — SET TRI-LUMEN FILTERED TUBE

## (undated) DEVICE — SYR 50CC LL

## (undated) DEVICE — TRAY FOLEY 16FR INFECTION CONT

## (undated) DEVICE — SEE MEDLINE ITEM 157181

## (undated) DEVICE — DRAPE ABDOMINAL TIBURON 14X11

## (undated) DEVICE — TROCAR ENDOPATH XCEL 12MM 10CM

## (undated) DEVICE — NDL INSUF ULTRA VERESS 120MM